# Patient Record
Sex: MALE | Race: BLACK OR AFRICAN AMERICAN | Employment: FULL TIME | URBAN - METROPOLITAN AREA
[De-identification: names, ages, dates, MRNs, and addresses within clinical notes are randomized per-mention and may not be internally consistent; named-entity substitution may affect disease eponyms.]

---

## 2020-08-18 ENCOUNTER — OFFICE VISIT (OUTPATIENT)
Dept: ORTHOPEDIC SURGERY | Age: 65
End: 2020-08-18
Payer: OTHER GOVERNMENT

## 2020-08-18 VITALS — WEIGHT: 180 LBS | RESPIRATION RATE: 15 BRPM | BODY MASS INDEX: 27.28 KG/M2 | HEIGHT: 68 IN

## 2020-08-18 PROCEDURE — 99203 OFFICE O/P NEW LOW 30 MIN: CPT | Performed by: ORTHOPAEDIC SURGERY

## 2020-08-18 RX ORDER — PRAVASTATIN SODIUM 40 MG
TABLET ORAL
COMMUNITY
Start: 2020-08-08

## 2020-08-18 ASSESSMENT — ENCOUNTER SYMPTOMS
EYE REDNESS: 0
EYE PAIN: 0
CHEST TIGHTNESS: 0
COLOR CHANGE: 0
SHORTNESS OF BREATH: 0
WHEEZING: 0
VOMITING: 0

## 2020-08-18 NOTE — PATIENT INSTRUCTIONS
Continue weight-bearing as tolerated. Continue range of motion exercises as instructed. Ice and elevate as needed. Tylenol or Motrin for pain. We will schedule surgery at soonest convenience. If you have any questions regarding your surgery please call our office and ask to speak with Fallon.  [phone]

## 2020-08-18 NOTE — PROGRESS NOTES
Patient is here today for right shoulder pain. Patient states that he did have his left rotator cuff repaired in 2016 by Dr Will Carcamo. Patient states that his right shoulder is starting to feel the same way his left did. Patient states he can't sleep at night do to the pain. He has been doing physical therapy which has help with his ROM. patient states nothing helps with the pain. He will take 800 mg of ibuprofen to help with the swelling in the right shoulder. Patient states pain today is an 8/10 he is in constant pain. When he is using his arm he will feel some sharp stabbing pain in the anterior part of the shoulder.

## 2020-08-19 NOTE — PROGRESS NOTES
8/18/2020   Chief Complaint   Patient presents with    Shoulder Pain     right shoulder pain (MRI completed) referred per Guru        History of Present Illness:                             Melissa Lechuga is a 59 y.o. male who presents today for evaluation of his right shoulder pain and weakness. He does not recall any severe injuries but has had multiple minor strains to the right shoulder over the years and has developed progressive worsening pain and weakness issues that have been significantly limiting to him over the last 6 months. He is now having difficulty with extending his arm overhead and reaching out away from his body. His pain is most severe at the superior and lateral aspects of his arm and worse with lifting pushing pulling activities. He also has pain while lying on that side at night. His symptoms feel similar to what he is experienced in the past on his left shoulder. He previously had a rotator cuff tear on the left that was repaired by myself in 2016. He went on to heal well from this procedure and is now functioning extremely well with respect to his left shoulder. Patient is here today for right shoulder pain. Patient states that he did have his left rotator cuff repaired in 2016 by Dr Karolyn Burrell. Patient states that his right shoulder is starting to feel the same way his left did. Patient states he can't sleep at night do to the pain. He has been doing physical therapy which has help with his ROM. patient states nothing helps with the pain. He will take 800 mg of ibuprofen to help with the swelling in the right shoulder. Patient states pain today is an 8/10 he is in constant pain. When he is using his arm he will feel some sharp stabbing pain in the anterior part of the shoulder. Medical History  Patient's medications, allergies, past medical, surgical, social and family histories were reviewed and updated as appropriate. No past medical history on file.   No family history on file. Social History     Socioeconomic History    Marital status: Single     Spouse name: None    Number of children: None    Years of education: None    Highest education level: None   Occupational History    None   Social Needs    Financial resource strain: None    Food insecurity     Worry: None     Inability: None    Transportation needs     Medical: None     Non-medical: None   Tobacco Use    Smoking status: Unknown If Ever Smoked   Substance and Sexual Activity    Alcohol use: Not Currently    Drug use: Never    Sexual activity: Yes   Lifestyle    Physical activity     Days per week: None     Minutes per session: None    Stress: None   Relationships    Social connections     Talks on phone: None     Gets together: None     Attends Zoroastrianism service: None     Active member of club or organization: None     Attends meetings of clubs or organizations: None     Relationship status: None    Intimate partner violence     Fear of current or ex partner: None     Emotionally abused: None     Physically abused: None     Forced sexual activity: None   Other Topics Concern    None   Social History Narrative    None     Current Outpatient Medications   Medication Sig Dispense Refill    pravastatin (PRAVACHOL) 40 MG tablet TAKE 1 TABLET BY MOUTH ONCE DAILY       No current facility-administered medications for this visit. No Known Allergies    Review of Systems:   Review of Systems   Constitutional: Negative for chills and fever. HENT: Negative for congestion and sneezing. Eyes: Negative for pain and redness. Respiratory: Negative for chest tightness, shortness of breath and wheezing. Cardiovascular: Negative for chest pain and palpitations. Gastrointestinal: Negative for vomiting. Musculoskeletal: Positive for arthralgias. Skin: Negative for color change and rash. Psychiatric/Behavioral: Negative for agitation. The patient is not nervous/anxious. Examination:  General Exam:  Vitals: Resp 15   Ht 5' 8\" (1.727 m)   Wt 180 lb (81.6 kg)   BMI 27.37 kg/m²    Physical Exam  Vitals signs and nursing note reviewed. Constitutional:       Appearance: Normal appearance. HENT:      Head: Normocephalic and atraumatic. Eyes:      Conjunctiva/sclera: Conjunctivae normal.      Pupils: Pupils are equal, round, and reactive to light. Neck:      Musculoskeletal: Normal range of motion. Pulmonary:      Effort: Pulmonary effort is normal.   Musculoskeletal:      Left shoulder: He exhibits normal range of motion, no tenderness, no bony tenderness, no swelling, no deformity, no laceration, no pain, normal pulse and normal strength. Right elbow: He exhibits normal range of motion, no swelling, no effusion, no deformity and no laceration. No tenderness found. Left elbow: Normal.      Comments: Right Upper Extremity:    There is moderate to severe tenderness diffusely throughout the shoulder. Tenderness to palpation is maximal over the anterior and lateral aspects of the shoulder specifically along the greater tuberosity and proximal biceps tendon. Active range of motion is limited due to pain. Forward elevation present to 100 degrees, abduction present to 80 degrees, external rotation 15 degrees. Passive range of motion is moderately restricted and limited due to pain and apprehension. Forward elevation 120 degrees, abduction 100 degrees. Rotator cuff testing is difficult due to pain. Strength 4/5 forward elevation and abduction of the shoulder. There is breakaway to strength testing due to pain. Positive empty can test.  Positive drop arm sign. Positive Gusman and Neer signs. Moderate pain at the acromioclavicular joint with crossarm test.    Skin is intact. Sensation is intact in the upper extremity. 2+ radial pulse. No edema. No muscle atrophy. Skin:     General: Skin is warm and dry.    Neurological:      Mental repair. We discussed the potential for residual pain, weakness, or stiffness at the shoulder despite appropriate surgical intervention. We discussed the goals of surgery to restore range of motion and strength which may take months to return. The patient understands and would like to proceed with shoulder arthroscopy for rotator cuff repair and subacromial decompression. The patient was counseled at length about the risks of luba Covid-19 during their perioperative period and any recovery window from their procedure. The patient was made aware that luba Covid-19  may worsen their prognosis for recovering from their procedure  and lend to a higher morbidity and/or mortality risk. All material risks, benefits, and reasonable alternatives including postponing the procedure were discussed. The patient does wish to proceed with the procedure at this time.       Electronically signed by Steven Rollins MD on 8/18/2020 at 8:16 PM

## 2020-08-26 ENCOUNTER — TELEPHONE (OUTPATIENT)
Dept: ORTHOPEDIC SURGERY | Age: 65
End: 2020-08-26

## 2020-08-26 ENCOUNTER — ANESTHESIA EVENT (OUTPATIENT)
Dept: OPERATING ROOM | Age: 65
End: 2020-08-26
Payer: OTHER GOVERNMENT

## 2020-08-26 NOTE — TELEPHONE ENCOUNTER
Left message  Patient needs pre testing done.  He is to arrive at the hospital in [facility] at 8 am on 8/27/2020 to have pre testing and his covid testing done before surgery on Monday august 31, 2020

## 2020-08-27 ENCOUNTER — HOSPITAL ENCOUNTER (OUTPATIENT)
Dept: PREADMISSION TESTING | Age: 65
Discharge: HOME | End: 2020-08-31
Payer: COMMERCIAL

## 2020-08-27 VITALS
SYSTOLIC BLOOD PRESSURE: 147 MMHG | HEART RATE: 80 BPM | WEIGHT: 174 LBS | HEIGHT: 65 IN | BODY MASS INDEX: 28.99 KG/M2 | TEMPERATURE: 97.4 F | OXYGEN SATURATION: 99 % | DIASTOLIC BLOOD PRESSURE: 85 MMHG | RESPIRATION RATE: 16 BRPM

## 2020-08-27 LAB
ANION GAP SERPL CALCULATED.3IONS-SCNC: 10 MMOL/L (ref 4–16)
BUN BLDV-MCNC: 15 MG/DL (ref 6–23)
CALCIUM SERPL-MCNC: 10 MG/DL (ref 8.3–10.6)
CHLORIDE BLD-SCNC: 105 MMOL/L (ref 99–110)
CO2: 26 MMOL/L (ref 21–32)
CREAT SERPL-MCNC: 1.2 MG/DL (ref 0.9–1.3)
EKG ATRIAL RATE: 70 BPM
EKG DIAGNOSIS: NORMAL
EKG P AXIS: 67 DEGREES
EKG P-R INTERVAL: 142 MS
EKG Q-T INTERVAL: 378 MS
EKG QRS DURATION: 86 MS
EKG QTC CALCULATION (BAZETT): 408 MS
EKG R AXIS: -1 DEGREES
EKG T AXIS: 17 DEGREES
EKG VENTRICULAR RATE: 70 BPM
GFR AFRICAN AMERICAN: >60 ML/MIN/1.73M2
GFR NON-AFRICAN AMERICAN: >60 ML/MIN/1.73M2
GLUCOSE BLD-MCNC: 111 MG/DL (ref 70–99)
HCT VFR BLD CALC: 49 % (ref 42–52)
HEMOGLOBIN: 15 GM/DL (ref 13.5–18)
MCH RBC QN AUTO: 28 PG (ref 27–31)
MCHC RBC AUTO-ENTMCNC: 30.6 % (ref 32–36)
MCV RBC AUTO: 91.4 FL (ref 78–100)
PDW BLD-RTO: 12.8 % (ref 11.7–14.9)
PLATELET # BLD: 267 K/CU MM (ref 140–440)
PMV BLD AUTO: 11 FL (ref 7.5–11.1)
POTASSIUM SERPL-SCNC: 4.8 MMOL/L (ref 3.5–5.1)
RBC # BLD: 5.36 M/CU MM (ref 4.6–6.2)
SODIUM BLD-SCNC: 141 MMOL/L (ref 135–145)
WBC # BLD: 4.8 K/CU MM (ref 4–10.5)

## 2020-08-27 PROCEDURE — 85027 COMPLETE CBC AUTOMATED: CPT

## 2020-08-27 PROCEDURE — 36415 COLL VENOUS BLD VENIPUNCTURE: CPT

## 2020-08-27 PROCEDURE — 93010 ELECTROCARDIOGRAM REPORT: CPT | Performed by: INTERNAL MEDICINE

## 2020-08-27 PROCEDURE — U0002 COVID-19 LAB TEST NON-CDC: HCPCS

## 2020-08-27 PROCEDURE — C9803 HOPD COVID-19 SPEC COLLECT: HCPCS

## 2020-08-27 PROCEDURE — 80048 BASIC METABOLIC PNL TOTAL CA: CPT

## 2020-08-27 PROCEDURE — 93005 ELECTROCARDIOGRAM TRACING: CPT | Performed by: NURSE PRACTITIONER

## 2020-08-27 RX ORDER — AMOXICILLIN 500 MG
CAPSULE ORAL DAILY
COMMUNITY

## 2020-08-27 RX ORDER — ASPIRIN 325 MG
325 TABLET ORAL DAILY
COMMUNITY

## 2020-08-27 RX ORDER — LANOLIN ALCOHOL/MO/W.PET/CERES
500 CREAM (GRAM) TOPICAL 2 TIMES DAILY
COMMUNITY

## 2020-08-27 ASSESSMENT — PAIN DESCRIPTION - ONSET: ONSET: ON-GOING

## 2020-08-27 ASSESSMENT — PAIN DESCRIPTION - PAIN TYPE: TYPE: CHRONIC PAIN

## 2020-08-27 ASSESSMENT — PAIN DESCRIPTION - ORIENTATION: ORIENTATION: RIGHT

## 2020-08-27 ASSESSMENT — PAIN DESCRIPTION - FREQUENCY: FREQUENCY: INTERMITTENT

## 2020-08-27 ASSESSMENT — PAIN DESCRIPTION - PROGRESSION: CLINICAL_PROGRESSION: GRADUALLY WORSENING

## 2020-08-27 ASSESSMENT — PAIN SCALES - GENERAL: PAINLEVEL_OUTOF10: 7

## 2020-08-27 ASSESSMENT — PAIN DESCRIPTION - LOCATION: LOCATION: SHOULDER

## 2020-08-27 NOTE — ANESTHESIA PRE PROCEDURE
Procedure Laterality Date    COLONOSCOPY  age51's    HAND SURGERY      \"tendon snapped in thumb right hand - over 20 yrs\"    SHOULDER SURGERY Left 2016    rot cuff    VASECTOMY         Social History:    Social History     Tobacco Use    Smoking status: Former Smoker     Packs/day: 0.25     Years: 7.00     Pack years: 1.75     Types: Cigarettes     Last attempt to quit: 2000     Years since quittin.5    Smokeless tobacco: Never Used   Substance Use Topics    Alcohol use: Not Currently     Comment: average\"2 - 3imes per week- 1-2 beers \"                                Counseling given: Not Answered      Vital Signs (Current):   Vitals:    20 0823   BP: (!) 147/85   Pulse: 80   Resp: 16   Temp: 36.3 °C (97.4 °F)   TempSrc: Temporal   SpO2: 99%   Weight: 174 lb (78.9 kg)   Height: 5' 5\" (1.651 m)                                              BP Readings from Last 3 Encounters:   20 (!) 147/85       NPO Status:                                                                                 BMI:   Wt Readings from Last 3 Encounters:   20 174 lb (78.9 kg)   20 180 lb (81.6 kg)     Body mass index is 28.96 kg/m².       CBC  Lab Results   Component Value Date/Time    WBC 4.8 2020 08:20 AM    HGB 15.0 2020 08:20 AM    HCT 49.0 2020 08:20 AM     2020 08:20 AM     RENAL  Lab Results   Component Value Date/Time     2020 08:20 AM    K 4.8 2020 08:20 AM     2020 08:20 AM    CO2 26 2020 08:20 AM    BUN 15 2020 08:20 AM    CREATININE 1.2 2020 08:20 AM    GLUCOSE 111 (H) 2020 08:20 AM         COVID-19 Screening (If Applicable): No results found for: COVID19      Anesthesia Evaluation  Patient summary reviewed and Nursing notes reviewed no history of anesthetic complications:   Airway: Mallampati: II  TM distance: >3 FB   Neck ROM: full  Mouth opening: > = 3 FB Dental: normal exam         Pulmonary: ROS comment:   Remote former smoker      PAT Airway. Limited exam. COVID 19 precautions. Patient wearing mask  Head/Neck movement: full  History of difficult intubation:  None  Teeth: missing upper and lower molars   Able to lie flat       COVID 19 screening  Denies recent fever or known COVID 19 exposure. Instructed to quarantine until surgery and report any new respiratory or fever symptoms to surgeon. Aware COVID testing must be completed before DOS. COVID swab:  2020   Cardiovascular:  Exercise tolerance: good (>4 METS),   (+) hyperlipidemia      ECG reviewed               Beta Blocker:  Not on Beta Blocker      ROS comment:   CP or SOB: NO  Exercise:  walks 3 miles per day    EK2020  NSR     Neuro/Psych:   Negative Neuro/Psych ROS               ROS comment: Mini-cog evaluation performed per anesthesia protocol,    > age 72. Scored: /  Copy on chart for review. GI/Hepatic/Renal:        (-) GERD       Endo/Other:    (+) blood dyscrasia (on ASA 325mg, last dose 2020): anticoagulation therapy, arthritis (suha shoulders s/p left rotator cuff repair, 2016. Full ROM): OA., . Pt had PAT visit. ROS comment: Tendon repair right thumb,  Abdominal:           Vascular:                                    Anesthesia Plan      general and regional     ASA 3       Induction: intravenous. Anesthetic plan and risks discussed with patient. Plan discussed with CRNA and attending. Attending anesthesiologist reviewed and agrees with Pre Eval content              KEATON Hood - CNP Chart reviewed, pt. Interviewed and examined. Anesthesia Evaluation will follow by a certified practitioner prior to surgery.   2020

## 2020-08-28 LAB
SARS-COV-2: NOT DETECTED
SOURCE: NORMAL

## 2020-08-31 ENCOUNTER — HOSPITAL ENCOUNTER (OUTPATIENT)
Age: 65
Setting detail: OUTPATIENT SURGERY
Discharge: HOME | End: 2020-08-31
Attending: ORTHOPAEDIC SURGERY | Admitting: ORTHOPAEDIC SURGERY
Payer: OTHER GOVERNMENT

## 2020-08-31 ENCOUNTER — ANESTHESIA (OUTPATIENT)
Dept: OPERATING ROOM | Age: 65
End: 2020-08-31
Payer: OTHER GOVERNMENT

## 2020-08-31 VITALS
TEMPERATURE: 95.5 F | OXYGEN SATURATION: 100 % | DIASTOLIC BLOOD PRESSURE: 91 MMHG | RESPIRATION RATE: 19 BRPM | SYSTOLIC BLOOD PRESSURE: 120 MMHG

## 2020-08-31 VITALS
TEMPERATURE: 96.7 F | WEIGHT: 174 LBS | DIASTOLIC BLOOD PRESSURE: 79 MMHG | RESPIRATION RATE: 16 BRPM | HEIGHT: 65 IN | SYSTOLIC BLOOD PRESSURE: 146 MMHG | HEART RATE: 92 BPM | OXYGEN SATURATION: 94 % | BODY MASS INDEX: 28.99 KG/M2

## 2020-08-31 PROCEDURE — 2580000003 HC RX 258

## 2020-08-31 PROCEDURE — 2580000003 HC RX 258: Performed by: ANESTHESIOLOGY

## 2020-08-31 PROCEDURE — C1713 ANCHOR/SCREW BN/BN,TIS/BN: HCPCS | Performed by: ORTHOPAEDIC SURGERY

## 2020-08-31 PROCEDURE — 3700000001 HC ADD 15 MINUTES (ANESTHESIA): Performed by: ORTHOPAEDIC SURGERY

## 2020-08-31 PROCEDURE — 6360000002 HC RX W HCPCS: Performed by: ORTHOPAEDIC SURGERY

## 2020-08-31 PROCEDURE — L3650 SO 8 ABD RESTRAINT PRE OTS: HCPCS | Performed by: ORTHOPAEDIC SURGERY

## 2020-08-31 PROCEDURE — 6360000002 HC RX W HCPCS

## 2020-08-31 PROCEDURE — 7100000010 HC PHASE II RECOVERY - FIRST 15 MIN: Performed by: ORTHOPAEDIC SURGERY

## 2020-08-31 PROCEDURE — 29826 SHO ARTHRS SRG DECOMPRESSION: CPT | Performed by: ORTHOPAEDIC SURGERY

## 2020-08-31 PROCEDURE — 3600000014 HC SURGERY LEVEL 4 ADDTL 15MIN: Performed by: ORTHOPAEDIC SURGERY

## 2020-08-31 PROCEDURE — 7100000001 HC PACU RECOVERY - ADDTL 15 MIN: Performed by: ORTHOPAEDIC SURGERY

## 2020-08-31 PROCEDURE — 2709999900 HC NON-CHARGEABLE SUPPLY: Performed by: ORTHOPAEDIC SURGERY

## 2020-08-31 PROCEDURE — 7100000011 HC PHASE II RECOVERY - ADDTL 15 MIN: Performed by: ORTHOPAEDIC SURGERY

## 2020-08-31 PROCEDURE — 64415 NJX AA&/STRD BRCH PLXS IMG: CPT

## 2020-08-31 PROCEDURE — 2720000010 HC SURG SUPPLY STERILE: Performed by: ORTHOPAEDIC SURGERY

## 2020-08-31 PROCEDURE — 3600000004 HC SURGERY LEVEL 4 BASE: Performed by: ORTHOPAEDIC SURGERY

## 2020-08-31 PROCEDURE — 7100000000 HC PACU RECOVERY - FIRST 15 MIN: Performed by: ORTHOPAEDIC SURGERY

## 2020-08-31 PROCEDURE — 29827 SHO ARTHRS SRG RT8TR CUF RPR: CPT | Performed by: ORTHOPAEDIC SURGERY

## 2020-08-31 PROCEDURE — 2500000003 HC RX 250 WO HCPCS

## 2020-08-31 PROCEDURE — 3700000000 HC ANESTHESIA ATTENDED CARE: Performed by: ORTHOPAEDIC SURGERY

## 2020-08-31 DEVICE — ANCHOR SUTURE BIOCOMP 4.75X19.1 MM SWIVELOCK C: Type: IMPLANTABLE DEVICE | Site: SHOULDER | Status: FUNCTIONAL

## 2020-08-31 DEVICE — ANCHOR SUT L14.7MM DIA5.5MM BIOCOMPOSITE FULL THRD W/ 2: Type: IMPLANTABLE DEVICE | Site: SHOULDER | Status: FUNCTIONAL

## 2020-08-31 RX ORDER — FENTANYL CITRATE 50 UG/ML
25 INJECTION, SOLUTION INTRAMUSCULAR; INTRAVENOUS EVERY 5 MIN PRN
Status: DISCONTINUED | OUTPATIENT
Start: 2020-08-31 | End: 2020-08-31 | Stop reason: HOSPADM

## 2020-08-31 RX ORDER — SODIUM CHLORIDE, SODIUM LACTATE, POTASSIUM CHLORIDE, CALCIUM CHLORIDE 600; 310; 30; 20 MG/100ML; MG/100ML; MG/100ML; MG/100ML
INJECTION, SOLUTION INTRAVENOUS CONTINUOUS
Status: DISCONTINUED | OUTPATIENT
Start: 2020-08-31 | End: 2020-08-31 | Stop reason: HOSPADM

## 2020-08-31 RX ORDER — FENTANYL CITRATE 50 UG/ML
INJECTION, SOLUTION INTRAMUSCULAR; INTRAVENOUS PRN
Status: DISCONTINUED | OUTPATIENT
Start: 2020-08-31 | End: 2020-08-31 | Stop reason: SDUPTHER

## 2020-08-31 RX ORDER — FENTANYL CITRATE 50 UG/ML
50 INJECTION, SOLUTION INTRAMUSCULAR; INTRAVENOUS EVERY 5 MIN PRN
Status: DISCONTINUED | OUTPATIENT
Start: 2020-08-31 | End: 2020-08-31 | Stop reason: HOSPADM

## 2020-08-31 RX ORDER — HYDROMORPHONE HCL 110MG/55ML
0.25 PATIENT CONTROLLED ANALGESIA SYRINGE INTRAVENOUS EVERY 5 MIN PRN
Status: DISCONTINUED | OUTPATIENT
Start: 2020-08-31 | End: 2020-08-31 | Stop reason: HOSPADM

## 2020-08-31 RX ORDER — PROPOFOL 10 MG/ML
INJECTION, EMULSION INTRAVENOUS PRN
Status: DISCONTINUED | OUTPATIENT
Start: 2020-08-31 | End: 2020-08-31 | Stop reason: SDUPTHER

## 2020-08-31 RX ORDER — HYDRALAZINE HYDROCHLORIDE 20 MG/ML
5 INJECTION INTRAMUSCULAR; INTRAVENOUS EVERY 10 MIN PRN
Status: DISCONTINUED | OUTPATIENT
Start: 2020-08-31 | End: 2020-08-31 | Stop reason: HOSPADM

## 2020-08-31 RX ORDER — ROPIVACAINE HYDROCHLORIDE 5 MG/ML
INJECTION, SOLUTION EPIDURAL; INFILTRATION; PERINEURAL
Status: COMPLETED | OUTPATIENT
Start: 2020-08-31 | End: 2020-08-31

## 2020-08-31 RX ORDER — ROCURONIUM BROMIDE 10 MG/ML
INJECTION, SOLUTION INTRAVENOUS PRN
Status: DISCONTINUED | OUTPATIENT
Start: 2020-08-31 | End: 2020-08-31 | Stop reason: SDUPTHER

## 2020-08-31 RX ORDER — PHENYLEPHRINE HYDROCHLORIDE 10 MG/ML
INJECTION INTRAVENOUS PRN
Status: DISCONTINUED | OUTPATIENT
Start: 2020-08-31 | End: 2020-08-31 | Stop reason: SDUPTHER

## 2020-08-31 RX ORDER — PROMETHAZINE HYDROCHLORIDE 25 MG/ML
6.25 INJECTION, SOLUTION INTRAMUSCULAR; INTRAVENOUS
Status: DISCONTINUED | OUTPATIENT
Start: 2020-08-31 | End: 2020-08-31 | Stop reason: HOSPADM

## 2020-08-31 RX ORDER — LIDOCAINE HYDROCHLORIDE 20 MG/ML
INJECTION, SOLUTION INTRAVENOUS PRN
Status: DISCONTINUED | OUTPATIENT
Start: 2020-08-31 | End: 2020-08-31 | Stop reason: SDUPTHER

## 2020-08-31 RX ORDER — NEOSTIGMINE METHYLSULFATE 1 MG/ML
INJECTION, SOLUTION INTRAVENOUS PRN
Status: DISCONTINUED | OUTPATIENT
Start: 2020-08-31 | End: 2020-08-31 | Stop reason: SDUPTHER

## 2020-08-31 RX ORDER — LABETALOL HYDROCHLORIDE 5 MG/ML
5 INJECTION, SOLUTION INTRAVENOUS EVERY 10 MIN PRN
Status: DISCONTINUED | OUTPATIENT
Start: 2020-08-31 | End: 2020-08-31 | Stop reason: HOSPADM

## 2020-08-31 RX ORDER — OXYCODONE HYDROCHLORIDE AND ACETAMINOPHEN 5; 325 MG/1; MG/1
1 TABLET ORAL EVERY 6 HOURS PRN
Qty: 28 TABLET | Refills: 0 | Status: SHIPPED | OUTPATIENT
Start: 2020-08-31 | End: 2020-09-07

## 2020-08-31 RX ORDER — ONDANSETRON 2 MG/ML
4 INJECTION INTRAMUSCULAR; INTRAVENOUS
Status: DISCONTINUED | OUTPATIENT
Start: 2020-08-31 | End: 2020-08-31 | Stop reason: HOSPADM

## 2020-08-31 RX ORDER — GLYCOPYRROLATE 0.2 MG/ML
INJECTION INTRAMUSCULAR; INTRAVENOUS PRN
Status: DISCONTINUED | OUTPATIENT
Start: 2020-08-31 | End: 2020-08-31 | Stop reason: SDUPTHER

## 2020-08-31 RX ORDER — HYDROMORPHONE HCL 110MG/55ML
0.5 PATIENT CONTROLLED ANALGESIA SYRINGE INTRAVENOUS EVERY 5 MIN PRN
Status: DISCONTINUED | OUTPATIENT
Start: 2020-08-31 | End: 2020-08-31 | Stop reason: HOSPADM

## 2020-08-31 RX ORDER — IBUPROFEN 600 MG/1
600 TABLET ORAL 3 TIMES DAILY PRN
Qty: 90 TABLET | Refills: 0 | Status: SHIPPED | OUTPATIENT
Start: 2020-08-31

## 2020-08-31 RX ADMIN — PHENYLEPHRINE HYDROCHLORIDE 100 MCG: 10 INJECTION INTRAVENOUS at 10:24

## 2020-08-31 RX ADMIN — ROPIVACAINE HYDROCHLORIDE 20 ML: 5 INJECTION, SOLUTION EPIDURAL; INFILTRATION; PERINEURAL at 09:20

## 2020-08-31 RX ADMIN — Medication 4 MG: at 11:45

## 2020-08-31 RX ADMIN — ROCURONIUM BROMIDE 40 MG: 10 INJECTION INTRAVENOUS at 10:01

## 2020-08-31 RX ADMIN — SODIUM CHLORIDE, POTASSIUM CHLORIDE, SODIUM LACTATE AND CALCIUM CHLORIDE: 600; 310; 30; 20 INJECTION, SOLUTION INTRAVENOUS at 11:03

## 2020-08-31 RX ADMIN — FENTANYL CITRATE 50 MCG: 50 INJECTION INTRAMUSCULAR; INTRAVENOUS at 11:58

## 2020-08-31 RX ADMIN — FENTANYL CITRATE 50 MCG: 50 INJECTION INTRAMUSCULAR; INTRAVENOUS at 10:06

## 2020-08-31 RX ADMIN — GLYCOPYRROLATE 0.4 MG: 0.2 INJECTION, SOLUTION INTRAMUSCULAR; INTRAVENOUS at 11:44

## 2020-08-31 RX ADMIN — PHENYLEPHRINE HYDROCHLORIDE 100 MCG: 10 INJECTION INTRAVENOUS at 10:38

## 2020-08-31 RX ADMIN — LIDOCAINE HYDROCHLORIDE 100 MG: 20 INJECTION, SOLUTION INTRAVENOUS at 09:59

## 2020-08-31 RX ADMIN — CEFAZOLIN SODIUM 2 G: 10 INJECTION, POWDER, FOR SOLUTION INTRAVENOUS at 10:04

## 2020-08-31 RX ADMIN — GLYCOPYRROLATE 0.2 MG: 0.2 INJECTION, SOLUTION INTRAMUSCULAR; INTRAVENOUS at 11:46

## 2020-08-31 RX ADMIN — PROPOFOL 150 MG: 10 INJECTION, EMULSION INTRAVENOUS at 10:00

## 2020-08-31 RX ADMIN — PHENYLEPHRINE HYDROCHLORIDE 100 MCG: 10 INJECTION INTRAVENOUS at 10:17

## 2020-08-31 RX ADMIN — SODIUM CHLORIDE, POTASSIUM CHLORIDE, SODIUM LACTATE AND CALCIUM CHLORIDE: 600; 310; 30; 20 INJECTION, SOLUTION INTRAVENOUS at 09:01

## 2020-08-31 RX ADMIN — FENTANYL CITRATE 50 MCG: 50 INJECTION INTRAMUSCULAR; INTRAVENOUS at 12:01

## 2020-08-31 RX ADMIN — SODIUM CHLORIDE, POTASSIUM CHLORIDE, SODIUM LACTATE AND CALCIUM CHLORIDE: 600; 310; 30; 20 INJECTION, SOLUTION INTRAVENOUS at 09:47

## 2020-08-31 RX ADMIN — PHENYLEPHRINE HYDROCHLORIDE 100 MCG: 10 INJECTION INTRAVENOUS at 10:33

## 2020-08-31 RX ADMIN — FENTANYL CITRATE 50 MCG: 50 INJECTION INTRAMUSCULAR; INTRAVENOUS at 10:00

## 2020-08-31 RX ADMIN — PHENYLEPHRINE HYDROCHLORIDE 20 MCG: 10 INJECTION INTRAVENOUS at 10:37

## 2020-08-31 ASSESSMENT — PULMONARY FUNCTION TESTS
PIF_VALUE: 17
PIF_VALUE: 18
PIF_VALUE: 1
PIF_VALUE: 11
PIF_VALUE: 18
PIF_VALUE: 18
PIF_VALUE: 24
PIF_VALUE: 19
PIF_VALUE: 17
PIF_VALUE: 18
PIF_VALUE: 18
PIF_VALUE: 20
PIF_VALUE: 22
PIF_VALUE: 19
PIF_VALUE: 18
PIF_VALUE: 21
PIF_VALUE: 26
PIF_VALUE: 20
PIF_VALUE: 19
PIF_VALUE: 18
PIF_VALUE: 17
PIF_VALUE: 18
PIF_VALUE: 6
PIF_VALUE: 17
PIF_VALUE: 18
PIF_VALUE: 17
PIF_VALUE: 18
PIF_VALUE: 1
PIF_VALUE: 17
PIF_VALUE: 18
PIF_VALUE: 19
PIF_VALUE: 2
PIF_VALUE: 18
PIF_VALUE: 18
PIF_VALUE: 17
PIF_VALUE: 18
PIF_VALUE: 17
PIF_VALUE: 19
PIF_VALUE: 18
PIF_VALUE: 17
PIF_VALUE: 19
PIF_VALUE: 0
PIF_VALUE: 25
PIF_VALUE: 18
PIF_VALUE: 18
PIF_VALUE: 25
PIF_VALUE: 17
PIF_VALUE: 18
PIF_VALUE: 18
PIF_VALUE: 22
PIF_VALUE: 18
PIF_VALUE: 17
PIF_VALUE: 17
PIF_VALUE: 5
PIF_VALUE: 18
PIF_VALUE: 20
PIF_VALUE: 18
PIF_VALUE: 18
PIF_VALUE: 17
PIF_VALUE: 18
PIF_VALUE: 19
PIF_VALUE: 18
PIF_VALUE: 18
PIF_VALUE: 20
PIF_VALUE: 20
PIF_VALUE: 0
PIF_VALUE: 22
PIF_VALUE: 18
PIF_VALUE: 17
PIF_VALUE: 39
PIF_VALUE: 27
PIF_VALUE: 21
PIF_VALUE: 18
PIF_VALUE: 32
PIF_VALUE: 18
PIF_VALUE: 17
PIF_VALUE: 18
PIF_VALUE: 0
PIF_VALUE: 20
PIF_VALUE: 19
PIF_VALUE: 18
PIF_VALUE: 17
PIF_VALUE: 0
PIF_VALUE: 18
PIF_VALUE: 28
PIF_VALUE: 0
PIF_VALUE: 18
PIF_VALUE: 7
PIF_VALUE: 16
PIF_VALUE: 26
PIF_VALUE: 23
PIF_VALUE: 18
PIF_VALUE: 20
PIF_VALUE: 1
PIF_VALUE: 17
PIF_VALUE: 1
PIF_VALUE: 18
PIF_VALUE: 18
PIF_VALUE: 16
PIF_VALUE: 18
PIF_VALUE: 22
PIF_VALUE: 18
PIF_VALUE: 17
PIF_VALUE: 22
PIF_VALUE: 6
PIF_VALUE: 19
PIF_VALUE: 5
PIF_VALUE: 19
PIF_VALUE: 17
PIF_VALUE: 18
PIF_VALUE: 2
PIF_VALUE: 17
PIF_VALUE: 19
PIF_VALUE: 18
PIF_VALUE: 25
PIF_VALUE: 18
PIF_VALUE: 0
PIF_VALUE: 18

## 2020-08-31 ASSESSMENT — PAIN SCALES - GENERAL
PAINLEVEL_OUTOF10: 0

## 2020-08-31 ASSESSMENT — PAIN DESCRIPTION - DESCRIPTORS: DESCRIPTORS: ACHING

## 2020-08-31 NOTE — ANESTHESIA POSTPROCEDURE EVALUATION
Department of Anesthesiology  Postprocedure Note    Patient: Dada Del Rio  MRN: [id number]  YOB: 1955  Date of evaluation: 8/31/2020  Time:  12:13 PM     Procedure Summary     Date:  08/31/20 Room / Location:  [address]    Anesthesia Start:  8148 Anesthesia Stop:      Procedure:  RIGHT SHOULDER ARTHROSCOPY ROTATOR CUFF REPAIR SUBACROMIAL DECOMPRESSION (Right Shoulder) Diagnosis:  (RIGHT ROTATOR CUFF TEAR)    Surgeon:  Kimberlyn Barrios MD Responsible Provider:  KEATON Carvalho CRNA    Anesthesia Type:  general, regional ASA Status:  3          Anesthesia Type: general, regional    Jose Phase I: Jose Score: 10    Jose Phase II:      Last vitals: Reviewed and per EMR flowsheets.        Anesthesia Post Evaluation    Patient location during evaluation: PACU  Patient participation: complete - patient participated  Level of consciousness: awake and alert  Pain score: 0  Airway patency: patent  Nausea & Vomiting: no nausea and no vomiting  Complications: no  Cardiovascular status: hemodynamically stable and blood pressure returned to baseline  Respiratory status: acceptable, spontaneous ventilation, nonlabored ventilation and face mask  Hydration status: stable

## 2020-08-31 NOTE — PROGRESS NOTES
1208: Arrived to PACU from OR. Monitors applied, alarms on. Report obtained from Corine Persaud and Levester Standing CRNA. 1235: Repositioned in bed, warm blankets on. Denies pain, voided 200cc clear yellow urine. States right arm and hand numb, able to wiggle fingers. 1245: Transported to [facility].

## 2020-08-31 NOTE — H&P
Chief Complaint   Patient presents with    Shoulder Pain       right shoulder pain (MRI completed) referred per Guru         History of Present Illness:                             Bert Hernández is a 59 y.o. male who presents today for evaluation of his right shoulder pain and weakness. He does not recall any severe injuries but has had multiple minor strains to the right shoulder over the years and has developed progressive worsening pain and weakness issues that have been significantly limiting to him over the last 6 months. He is now having difficulty with extending his arm overhead and reaching out away from his body. His pain is most severe at the superior and lateral aspects of his arm and worse with lifting pushing pulling activities. He also has pain while lying on that side at night.     His symptoms feel similar to what he is experienced in the past on his left shoulder. He previously had a rotator cuff tear on the left that was repaired by myself in 2016. He went on to heal well from this procedure and is now functioning extremely well with respect to his left shoulder.     Patient is here today for right shoulder pain. Patient states that he did have his left rotator cuff repaired in 2016 by Dr Danni Montana. Patient states that his right shoulder is starting to feel the same way his left did. Patient states he can't sleep at night do to the pain. He has been doing physical therapy which has help with his ROM. patient states nothing helps with the pain. He will take 800 mg of ibuprofen to help with the swelling in the right shoulder. Patient states pain today is an 8/10 he is in constant pain. When he is using his arm he will feel some sharp stabbing pain in the anterior part of the shoulder.      Medical History  Patient's medications, allergies, past medical, surgical, social and family histories were reviewed and updated as appropriate.     Past Medical History   No past medical history on file. Skin: Negative for color change and rash. Psychiatric/Behavioral: Negative for agitation. The patient is not nervous/anxious.                                                  Examination:  General Exam:  Vitals: Resp 15   Ht 5' 8\" (1.727 m)   Wt 180 lb (81.6 kg)   BMI 27.37 kg/m²    Physical Exam  Vitals signs and nursing note reviewed. Constitutional:       Appearance: Normal appearance. HENT:      Head: Normocephalic and atraumatic. Eyes:      Conjunctiva/sclera: Conjunctivae normal.      Pupils: Pupils are equal, round, and reactive to light. Neck:      Musculoskeletal: Normal range of motion. Pulmonary:      Effort: Pulmonary effort is normal.   Musculoskeletal:      Left shoulder: He exhibits normal range of motion, no tenderness, no bony tenderness, no swelling, no deformity, no laceration, no pain, normal pulse and normal strength. Right elbow: He exhibits normal range of motion, no swelling, no effusion, no deformity and no laceration. No tenderness found. Left elbow: Normal.      Comments: Right Upper Extremity:    There is moderate to severe tenderness diffusely throughout the shoulder. Tenderness to palpation is maximal over the anterior and lateral aspects of the shoulder specifically along the greater tuberosity and proximal biceps tendon. Active range of motion is limited due to pain. Forward elevation present to 100 degrees, abduction present to 80 degrees, external rotation 15 degrees. Passive range of motion is moderately restricted and limited due to pain and apprehension. Forward elevation 120 degrees, abduction 100 degrees. Rotator cuff testing is difficult due to pain. Strength 4/5 forward elevation and abduction of the shoulder. There is breakaway to strength testing due to pain. Positive empty can test.  Positive drop arm sign. Positive Gusman and Neer signs. Moderate pain at the acromioclavicular joint with crossarm test.    Skin is intact.   Sensation is intact in the upper extremity. 2+ radial pulse. No edema. No muscle atrophy. Skin:     General: Skin is warm and dry. Neurological:      Mental Status: He is alert and oriented to person, place, and time. Psychiatric:         Mood and Affect: Mood normal.         Behavior: Behavior normal.               Diagnostic testing:  X-rays reviewed in office, I independently reviewed the films in the office today:   Xr Shoulder Right (min 2 Views)     Result Date: 8/18/2020   X-ray 4 views Left Shoulder: X-rays show no evidence of fracture or degenerative process. Normal bone density. Normal alignment. No abnormal calcifications or soft tissue swelling. Mild spurring at the undersurface of the acromion consistent with impingement syndrome. Type II acromion Impression: Normal x-ray        MRI images of the right shoulder reviewed by myself and discussed with the patient:  There is evidence of full-thickness rotator cuff tear involving the supraspinatus tendon with retraction of approximately 2 cm. There is no significant atrophy of the supraspinatus musculature. No evidence of biceps tendon tear or subluxation in the groove. Normal appearance of the articular cartilage of the glenohumeral joint.     Office Procedures:  No orders of the defined types were placed in this encounter.       Assessment and Plan  1. Right shoulder full-thickness rotator cuff tear     2. Right shoulder impingement syndrome     I reviewed the findings of the MRI with the patient. We discussed the severity of the injury to the rotator cuff. Given the patient's symptoms and the findings on the MRI I have recommended that we proceed with shoulder arthroscopy for rotator cuff repair and subacromial decompression.     We discussed the risks and benefits of surgery. We discussed the postoperative course and need for initial immobilization followed by subsequent rehabilitation and physical therapy.   He understands this as he has previously gone through surgery on his left side.     We discussed the potential risks with surgery including possible incomplete repair or rerupture after successful repair. We discussed the potential for residual pain, weakness, or stiffness at the shoulder despite appropriate surgical intervention. We discussed the goals of surgery to restore range of motion and strength which may take months to return.     The patient understands and would like to proceed with shoulder arthroscopy for rotator cuff repair and subacromial decompression.     The patient was counseled at length about the risks of luba Covid-19 during their perioperative period and any recovery window from their procedure.  The patient was made aware that luba Covid-19  may worsen their prognosis for recovering from their procedure  and lend to a higher morbidity and/or mortality risk.  All material risks, benefits, and reasonable alternatives including postponing the procedure were discussed. The patient does wish to proceed with the procedure at this time. History and Physical exam note from the office visit is copied above from epic. I have reviewed the note and examined the patient and find no relevant changes.      I have reviewed with the patient and/or family the risks, benefits, and alternatives to the procedure as well as expected postoperative course    Irma Doyle MD

## 2020-08-31 NOTE — OP NOTE
[address] [place] [address], [address] [place] [address]                                OPERATIVE REPORT    PATIENT NAME: Wesley Arboleda                     :        1955  MED REC NO:   [id number]                          ROOM:  ACCOUNT NO:   [de-identified]                           ADMIT DATE: 2020  PROVIDER:     Gypsy Bonilla MD    DATE OF PROCEDURE:  2020    PREOPERATIVE DIAGNOSES:  1. Right shoulder full-thickness rotator cuff tear. 2.  Right shoulder impingement syndrome. POSTOPERATIVE DIAGNOSES:  1. Right shoulder full-thickness rotator cuff tear. 2.  Right shoulder impingement syndrome. 3.  Right shoulder high-grade partial tear of biceps tendon of  intraarticular portion of the long head of the biceps tendon. SURGEON:  Gypsy Bonilla MD    ANESTHESIA:  General with a regional nerve block. ESTIMATED BLOOD LOSS:  Minimal.    COMPLICATIONS:  None. DISPOSITION:  To PACU in stable condition. OPERATIVE IMPLANTS:  Two Arthrex 5.5-mm bioabsorbable Corkscrew anchors  and two Arthrex bioabsorbable 4.75-mm SwiveLock anchors. PREOPERATIVE INDICATIONS:  The patient is a 57-year-old who has had  several months of worsening right shoulder pain and weakness and has  previously undergone similar issues with his left shoulder, which  previously responded well to surgical intervention by myself about three  years ago. He presented to my office with an MRI. We had a long  discussion regarding treatment options for his full-thickness rotator  cuff tear. I recommended surgical intervention for arthroscopic rotator  cuff repair and subacromial decompression. He understood and wished to  proceed and consent was obtained. DESCRIPTION OF PROCEDURE:  The patient was identified in the  preoperative area and the site was marked. A regional nerve block was  placed by the Anesthesia Department for postoperative pain relief.   He  was taken back to the operating room and placed supine on the beach  chair table. After induction of general endotracheal anesthesia, he was  placed semi-upright in the beach chair position with his head and neck  in neutral.  The right upper extremity was prepped and draped in sterile  fashion. Timeout was performed. Preoperative antibiotics were given. Examination under anesthesia revealed full passive range of motion with  no instability of the shoulder. The right upper extremity was prepped  and draped in sterile fashion. Time-out was performed. Preoperative  antibiotics were given. Standard posterior portal was established and arthroscope was inserted  intraarticularly. There was evidence of full-thickness tear involving  the supraspinatus tendon with mild retraction. There were severe  fraying and degenerative changes along the superior labrum and  intraarticular portion of the biceps tendon. Anterior portal was  established and then a shaver was used to perform an intraarticular  debridement along the frayed and torn superior labral tissue and biceps  tendon. Once this was completed, the biceps tendon was probed and found  to be highly torn, greater than 50% thickness with very degenerative  appearance of the remaining fibers adjacent to the labral attachment,  and therefore it was felt that he would benefit from a biceps tenotomy. The tenotomy was performed using the cautery device and shaver, and the  proximal biceps tendon was brought to retract into the bicipital groove. The remaining aspects of the shoulder appeared healthy with minimal  degenerative articular cartilage changes. There was no evidence of  tearing at the subscapularis tendon. Attention was then taken to the subacromial space and the posterior  portal was redirected.   A separate lateral portal was established and a  shaver was used to perform a bursectomy, and to better characterize and  evaluate the rotator cuff tissue,

## 2020-08-31 NOTE — PROGRESS NOTES
1850 - patient transferred to PACU for preoperative nerve block attended by [facility] CRNA and Dr Neymar Crisostomo.   0877 - patient transferred to OR on cart by anesthesia

## 2020-08-31 NOTE — PROGRESS NOTES
1247 Patient transferred from [facility] to [facility] via cart, he is resting quietly and denies needs at this time, patient has a sling in place ice on site, radial pulse is palpable, patient has sensation and is able to move his fingers, he denies pain at this time. 1315 Patient is resting quietly. 1345 Patient is sitting up drinking water and eating crackers. 333 Juan A Reyes wife Wilman Torrez and gave her report, told her patient has two prescriptions, one for percocet and one for ibuprofen. She verbalized understanding. 1430 Patient discharged to home, his wife will drive him.

## 2020-09-15 ENCOUNTER — OFFICE VISIT (OUTPATIENT)
Dept: ORTHOPEDIC SURGERY | Age: 65
End: 2020-09-15

## 2020-09-15 VITALS
BODY MASS INDEX: 28.99 KG/M2 | RESPIRATION RATE: 15 BRPM | HEIGHT: 65 IN | HEART RATE: 76 BPM | OXYGEN SATURATION: 94 % | WEIGHT: 174 LBS

## 2020-09-15 PROCEDURE — 99024 POSTOP FOLLOW-UP VISIT: CPT | Performed by: ORTHOPAEDIC SURGERY

## 2020-09-15 RX ORDER — OXYCODONE HYDROCHLORIDE AND ACETAMINOPHEN 5; 325 MG/1; MG/1
1 TABLET ORAL EVERY 8 HOURS PRN
Qty: 28 TABLET | Refills: 0 | Status: SHIPPED | OUTPATIENT
Start: 2020-09-15 | End: 2020-09-29

## 2020-09-15 NOTE — PATIENT INSTRUCTIONS
Continue weight-bearing as tolerated. Continue range of motion exercises as instructed. Ice and elevate as needed. Tylenol or Motrin for pain.   Sutures removed today   Follow up in one month   Physical therapy ordered today

## 2020-09-16 NOTE — PROGRESS NOTES
9/15/2020   Chief Complaint   Patient presents with    Post-Op Check     2 wk post right shoulder RC repair DOS 8/31/20        History of Present Illness:                             Bert Hernández is a 72 y.o. male who returns today for follow-up of his right shoulder rotator cuff repair. He has had a couple of minor injuries to the shoulder which increased his pain level denies any severe issues with instability or major fall. He complains of pain swelling stiffness throughout the shoulder but most significant anteriorly and laterally. He has been using a sling for comfort and support. He has been able to do mild range of motion activities as a home program.    Pt returns to the office today for 2 week post op check of the right shoulder RC repair DOS 8/31/20. Pt states pain today is a 5/10. He has injured his shoulder twice since surgery. Last week he went to push himself up in the car with his arms and felt instant increased pain in the right shoulder. He took some pain medication and the right shoulder felt better. Pt states last night he did run into the door in the middle of the night and felt instant pain in the anterior aspect of the right shoulder. Pt states he used ice to help with the pain but today he can still feel pain. He has been using his sling most of the time                                         Examination:  General Exam:  Vitals: Pulse 76   Resp 15   Ht 5' 5\" (1.651 m)   Wt 174 lb (78.9 kg)   SpO2 94%   BMI 28.96 kg/m²    Physical Exam   Operative Extremity:  Right upper extremity:  Well-healed surgical sites from arthroscopy. Sutures were removed. No erythema or drainage. Mild diffuse soft tissue swelling throughout the anterior lateral shoulder. Passive range of motion is present with forward elevation to 90 degrees and abduction to 80 degrees and gentle internal and external rotation with the arm by the side.   Sensation motor function is intact throughout the upper extremity. Assessment and Plan  1. Right shoulder rotator cuff repair and subacromial decompression    We reviewed the findings of the shoulder arthroscopy and the repair that was performed. I have explained to him that based on  His injury mechanism and current exam I do not suspect that he has disrupted his repair. I recommended that we continue with protection and immobilization as needed in the sling. He will be allowed to remove it for gentle range of motion activities and I have ordered physical therapy to assist him in stretching and range of motion of the shoulder. Continue using the sling but he can discontinue use after 2 weeks if therapy is going well. Follow-up in 1 month for check of his progress with therapy we will discuss advancing strengthening activities. He is continuing to have some mild swelling in his lower extremities and I recommended compression stockings and elevation to help with his lower extremity swelling. If this does not take care of his problem he should check with his primary care physician.     Electronically signed by Claudia Awan MD on 9/16/2020 at 2:33 PM

## 2020-10-03 ENCOUNTER — HOSPITAL ENCOUNTER (OUTPATIENT)
Dept: PHYSICAL THERAPY | Age: 65
Setting detail: THERAPIES SERIES
Discharge: HOME | End: 2020-10-03
Payer: OTHER GOVERNMENT

## 2020-10-03 PROCEDURE — 97016 VASOPNEUMATIC DEVICE THERAPY: CPT

## 2020-10-03 PROCEDURE — 97110 THERAPEUTIC EXERCISES: CPT

## 2020-10-03 PROCEDURE — 97140 MANUAL THERAPY 1/> REGIONS: CPT

## 2020-10-03 PROCEDURE — 97162 PT EVAL MOD COMPLEX 30 MIN: CPT

## 2020-10-03 ASSESSMENT — PAIN DESCRIPTION - ORIENTATION: ORIENTATION: RIGHT

## 2020-10-03 ASSESSMENT — PAIN - FUNCTIONAL ASSESSMENT: PAIN_FUNCTIONAL_ASSESSMENT: PREVENTS OR INTERFERES SOME ACTIVE ACTIVITIES AND ADLS

## 2020-10-03 ASSESSMENT — PAIN DESCRIPTION - FREQUENCY: FREQUENCY: CONTINUOUS

## 2020-10-03 ASSESSMENT — PAIN DESCRIPTION - PROGRESSION: CLINICAL_PROGRESSION: GRADUALLY IMPROVING

## 2020-10-03 ASSESSMENT — PAIN DESCRIPTION - LOCATION: LOCATION: SHOULDER

## 2020-10-03 ASSESSMENT — PAIN DESCRIPTION - ONSET: ONSET: GRADUAL

## 2020-10-03 ASSESSMENT — PAIN SCALES - GENERAL: PAINLEVEL_OUTOF10: 2

## 2020-10-03 ASSESSMENT — PAIN DESCRIPTION - PAIN TYPE: TYPE: SURGICAL PAIN

## 2020-10-03 NOTE — PLAN OF CARE
Outpatient Physical Therapy           [place]           [x] Phone: [phone]   Fax: [phone]  [place]           [] Phone: [phone]   Fax: [phone]     To: Referring Practitioner: Klever Urbano MD  From: Koko Feliciano PT, DPT     Patient: Jama Conte       : 1955  Diagnosis: Diagnosis: Traumatic complete tear of R RTC   Treatment Diagnosis: Treatment Diagnosis: R shoulder pain, RUE weakness, reduced R shoulder ROM   Date: 10/3/2020     Physical Therapy Certification/Re-Certification Form  Dear Dr. Suzie Ramirez,  The following patient has been evaluated for physical therapy services and for therapy to continue, insurance requires physician review of the treatment plan initially and every 90 days. Please review the attached evaluation and/or summary of the patient's plan of care, and verify that you agree therapy should continue by signing the attached document and sending it back to our office. Assessment:    Assessment: Pt is a 60-year-old male who underwent a RIGHT SHOULDER ARTHROSCOPY ROTATOR CUFF REPAIR (for Supraspinatus tear), SUBACROMIAL DECOMPRESSION and biceps tenotomy on 20. Pt presents with limitations in ROM of R shoulder, RUE weakness, inability to perform work related tasks, reduced independence with ADL tasks and R shoulder pain. Pt would benefit from skilled therapy interventions to address listed impairments, progress toward goal completion, and improve ADL independence. PT also warranted to reduce risk for re-injury or future decline. Patient agrees with established plan of care and assisted in the development of their short term and long term goals. Patient had no adverse reaction with initial treatment and there are no barriers to learning. Demonstrates no mental or cognitive disorder.      Plan of Care/Treatment to date:  [x] Therapeutic Exercise  [x] Modalities:  [x] Therapeutic Activity     [x] Ultrasound  [x] Electrical Stimulation  [] Gait Training [] Cervical Traction [] Lumbar Traction  [x] Neuromuscular Re-education    [x] Cold/hotpack [] Iontophoresis   [x] Instruction in HEP      [x] Vasopneumatic     [x] Manual Therapy               [] Aquatic Therapy       Other:          Frequency/Duration:  # Days per week: [] 1 day # Weeks: [] 1 week [] 5 weeks     [x] 2 days   [] 2 weeks [] 6 weeks     [] 3 days   [] 3 weeks [] 7 weeks     [] 4 days   [] 4 weeks [] 8 weeks         [] 9 weeks [] 10 weeks         [] 11 weeks [x] 12 weeks  (first 2 weeks 1x/week)    Rehab Potential/Progress: [] Excellent [x] Good [] Fair  [] Poor     Goals:      Short term goals  Time Frame for Short term goals: 4 weeks  Short term goal 1: Pt will acheive full PROM in all directions R shoulder  Short term goal 2: Pt will report 4 consecutive hours of sleep in bed or better with no interuption due to pain  Long term goals  Time Frame for Long term goals : 12 weeks  Long term goal 1: Pt will have full AROM in all directions R shoulder to aide in ADLs  Long term goal 2: Pt will improve RUE strength gross 4+/5 or higher to aide in work related tasks  Long term goal 3: Pt will improve Quick Dash to 33% or lower to show [address] and subjective improvement      Electronically signed by:  Jennifer Esposito PT, DPT 10/3/2020, 11:39 AM        If you have any questions or concerns, please don't hesitate to call.   Thank you for your referral.      Physician Signature:________________________________Date:_________ TIME: _____  By signing above, therapists plan is approved by physician

## 2020-10-03 NOTE — FLOWSHEET NOTE
Outpatient Physical Therapy  [place]           [x] Phone: [phone]   Fax: [phone]  [place]           [] Phone: [phone]   Fax: [phone]        Physical Therapy Daily Treatment Note  Date:  10/3/2020    Patient Name:  Ananda Antonio    :  1955  MRN: [id number]  Restrictions/Precautions:   RTC repair precautions - see protocol in chart  Diagnosis:   Diagnosis: Traumatic complete tear of R RTC  Date of Injury/Surgery: 20  Treatment Diagnosis: Treatment Diagnosis: R shoulder pain, RUE weakness, reduced R shoulder ROM    Insurance/Certification information: PT Insurance Information: UMR   Referring Physician:  Referring Practitioner: Butch Moody MD  Next Doctor Visit:  10/13  Plan of care signed (Y/N):  sent  Outcome Measure: Quick Dash: 43.18%  Visit# / total visits:    Pain level: 2/10   Goals:       Short term goals  Time Frame for Short term goals: 4 weeks  Short term goal 1: Pt will acheive full PROM in all directions R shoulder  Short term goal 2: Pt will report 4 consecutive hours of sleep in bed or better with no interuption due to pain  Long term goals  Time Frame for Long term goals : 12 weeks  Long term goal 1: Pt will have full AROM in all directions R shoulder to aide in ADLs  Long term goal 2: Pt will improve RUE strength gross 4+/5 or higher to aide in work related tasks  Long term goal 3: Pt will improve Quick Dash to 33% or lower to show [address] and subjective improvement    Summary of Evaluation: Assessment: Pt is a 60-year-old male who underwent a RIGHT SHOULDER ARTHROSCOPY ROTATOR CUFF REPAIR (for Supraspinatus tear), SUBACROMIAL DECOMPRESSION and biceps tenotomy on 20. Pt presents with limitations in ROM of R shoulder, RUE weakness, inability to perform work related tasks, reduced independence with ADL tasks and R shoulder pain.  Pt would benefit from skilled therapy interventions to address listed impairments, progress toward goal completion, and improve ADL independence. PT also warranted to reduce risk for re-injury or future decline. Subjective:  See eval         Any changes in Ambulatory Summary Sheet? None        Objective:  See eval   Prior to today's treatment session, patient was screened for signs and symptoms related to COVID-19 including but not limited to verbally answering questions related to feeling ill, cough, or SOB, along with taking temperature via forehead thermometer. Patient presented with all negative signs and symptoms and had no fever >100 degrees Fahrenheit this date. Exercises: (No more than 4 columns)   Exercise/Equipment 10/3/20 #1 Date Date           WARM UP         Pendumuls x1'           TABLE      Flex slides X10, 3-5\"     Scaption slides X10, 3-5\"     ER table slide/stretch X10, 3-5\"     Scap retractions X10, 5\"              STANDING                                                     PROPRIOCEPTION                                    MODALITIES                      Other Therapeutic Activities/Education:  HEP and importance of completion, POC and goals, anatomy and physiology related to condition. PT reviewed precautions for post op with patient and that AROM is currently not allowed. Home Exercise Program:    10/3: table slide flex, table slide scaption, table stretch ER, pendulums, scap retractions    Manual Treatments:  STM and trigger point release R scapular musculature. PROM R shoulder flex/ abd/ ER/ IR within pain limits    Modalities:  13' Vaso low pressure R shoulder in seated, no adverse skin reactions or complications after completion      Communication with other providers:  POC sent      Assessment:  0/10 end pain. Pt tolerated today's treatment without any adverse reactions or complications this date. Assessment: Pt is a 59-year-old male who underwent a RIGHT SHOULDER ARTHROSCOPY ROTATOR CUFF REPAIR (for Supraspinatus tear), SUBACROMIAL DECOMPRESSION and biceps tenotomy on 8/31/20.  Pt presents with limitations in ROM of R shoulder, RUE weakness, inability to perform work related tasks, reduced independence with ADL tasks and R shoulder pain. Pt would benefit from skilled therapy interventions to address listed impairments, progress toward goal completion, and improve ADL independence. PT also warranted to reduce risk for re-injury or future decline. Patient agrees with established plan of care and assisted in the development of their short term and long term goals. Patient had no adverse reaction with initial treatment and there are no barriers to learning. Demonstrates no mental or cognitive disorder.        Plan for Next Session: PROM only first 6 weeks - progress per protocol in chart      Time In / Time Out:     8298-0085      Timed Code/Total Treatment Minutes:  60': 8' TE x1, 15' MT x1, 15' Vaso x1, 22' Eval x1      Next Progress Note due:  10th visit    Plan of Care Interventions:  [x] Therapeutic Exercise  [x] Modalities:  [x] Therapeutic Activity     [x] Ultrasound  [x] Estim  [] Gait Training      [] Cervical Traction [] Lumbar Traction  [x] Neuromuscular Re-education    [x] Cold/hotpack [] Iontophoresis   [x] Instruction in HEP      [x] Vasopneumatic   [] Dry Needling    [x] Manual Therapy               [] Aquatic Therapy              Electronically signed by:  Akiko Willard, PT, DPT 10/3/2020, 11:40 AM

## 2020-10-09 ENCOUNTER — HOSPITAL ENCOUNTER (OUTPATIENT)
Dept: PHYSICAL THERAPY | Age: 65
Setting detail: THERAPIES SERIES
Discharge: HOME | End: 2020-10-09
Payer: OTHER GOVERNMENT

## 2020-10-09 PROCEDURE — 97110 THERAPEUTIC EXERCISES: CPT

## 2020-10-09 PROCEDURE — 97016 VASOPNEUMATIC DEVICE THERAPY: CPT

## 2020-10-09 PROCEDURE — 97140 MANUAL THERAPY 1/> REGIONS: CPT

## 2020-10-09 NOTE — FLOWSHEET NOTE
Outpatient Physical Therapy  [place]           [x] Phone: [phone]   Fax: [phone]  [place]           [] Phone: [phone]   Fax: [phone]        Physical Therapy Daily Treatment Note  Date:  10/9/2020    Patient Name:  Troy Church    :  1955  MRN: [id number]  Restrictions/Precautions:   RTC repair precautions - see protocol in chart  Diagnosis:   Diagnosis: Traumatic complete tear of R RTC  Date of Injury/Surgery: 20  Treatment Diagnosis: Treatment Diagnosis: R shoulder pain, RUE weakness, reduced R shoulder ROM    Insurance/Certification information: PT Insurance Information: R   Referring Physician:  Referring Practitioner: Sulaiman York MD  Next Doctor Visit:  10/13  Plan of care signed (Y/N): yes    Outcome Measure: Quick Dash: 43.18%  Visit# / total visits:    Pain level: 4/10   Goals:       Short term goals  Time Frame for Short term goals: 4 weeks  Short term goal 1: Pt will acheive full PROM in all directions R shoulder  Short term goal 2: Pt will report 4 consecutive hours of sleep in bed or better with no interuption due to pain  Long term goals  Time Frame for Long term goals : 12 weeks  Long term goal 1: Pt will have full AROM in all directions R shoulder to aide in ADLs  Long term goal 2: Pt will improve RUE strength gross 4+/5 or higher to aide in work related tasks  Long term goal 3: Pt will improve Quick Dash to 33% or lower to show [address] and subjective improvement    Summary of Evaluation: Assessment: Pt is a 71-year-old male who underwent a RIGHT SHOULDER ARTHROSCOPY ROTATOR CUFF REPAIR (for Supraspinatus tear), SUBACROMIAL DECOMPRESSION and biceps tenotomy on 20. Pt presents with limitations in ROM of R shoulder, RUE weakness, inability to perform work related tasks, reduced independence with ADL tasks and R shoulder pain.  Pt would benefit from skilled therapy interventions to address listed impairments, progress toward goal completion, and improve ADL independence. PT also warranted to reduce risk for re-injury or future decline. Subjective: Pt stated that his shoulder pain was about 4/10 today. PT stated that he decided to stop wearing his sling. Pt stated that he has been doing his HEP. Any changes in Ambulatory Summary Sheet? None        Objective:   Prior to today's treatment session, patient was screened for signs and symptoms related to COVID-19 including but not limited to verbally answering questions related to feeling ill, cough, or SOB, along with taking temperature via forehead thermometer. Patient presented with all negative signs and symptoms and had no fever >100 degrees Fahrenheit this date. PROM shoulder flexion 134°    Exercises: (No more than 4 columns)   Exercise/Equipment 10/3/20 #1 10/9/2020 #2 Date           WARM UP         Pendumuls x1' x1'          TABLE      Flex slides X10, 3-5\" 15x x 5\"    Scaption slides X10, 3-5\" 15x 5\"    ER table slide/stretch X10, 3-5\" 15x 5\"    Scap retractions X10, 5\" 10x2 5\"              STANDING                                                     PROPRIOCEPTION                                    MODALITIES                      Other Therapeutic Activities/Education:  HEP and importance of completion, POC and goals, anatomy and physiology related to condition. PT reviewed precautions for post op with patient and that AROM is currently not allowed. Home Exercise Program:    10/3: table slide flex, table slide scaption, table stretch ER, pendulums, scap retractions    Manual Treatments:  STM and trigger point release R scapular musculature. PROM R shoulder flex/ abd/ ER/ IR within pain limits    Modalities:  13' Vaso low pressure R shoulder in seated, no adverse skin reactions or complications after completion      Communication with other providers:  POC sent      Assessment:  Pt tolerated treatment fairly well. Pt was not able to get as much PROM shoulder flexion today.  Pt rated pain at 0/10 after vaso. Pt will continue to benefit from more therapy to increase ROM and strength as per protocol. Assessment: Pt is a 60-year-old male who underwent a RIGHT SHOULDER ARTHROSCOPY ROTATOR CUFF REPAIR (for Supraspinatus tear), SUBACROMIAL DECOMPRESSION and biceps tenotomy on 8/31/20. Pt presents with limitations in ROM of R shoulder, RUE weakness, inability to perform work related tasks, reduced independence with ADL tasks and R shoulder pain. Pt would benefit from skilled therapy interventions to address listed impairments, progress toward goal completion, and improve ADL independence. PT also warranted to reduce risk for re-injury or future decline. Patient agrees with established plan of care and assisted in the development of their short term and long term goals. Patient had no adverse reaction with initial treatment and there are no barriers to learning. Demonstrates no mental or cognitive disorder.        Plan for Next Session: PROM only first 6 weeks - progress per protocol in chart      Time In / Time Out:  0841/0921   11' late    Timed Code/Total Treatment Minutes:  30'/40' 1 man (15') 1 TE (15') 1 vaso (10')    Next Progress Note due:  10th visit    Plan of Care Interventions:  [x] Therapeutic Exercise  [x] Modalities:  [x] Therapeutic Activity     [x] Ultrasound  [x] Estim  [] Gait Training      [] Cervical Traction [] Lumbar Traction  [x] Neuromuscular Re-education    [x] Cold/hotpack [] Iontophoresis   [x] Instruction in HEP      [x] Vasopneumatic   [] Dry Needling    [x] Manual Therapy               [] Aquatic Therapy              Electronically signed by:  Leila Mairna PTA  10/9/2020, 8:24 AM     10/9/2020,12:39 PM

## 2020-10-13 ENCOUNTER — OFFICE VISIT (OUTPATIENT)
Dept: ORTHOPEDIC SURGERY | Age: 65
End: 2020-10-13

## 2020-10-13 VITALS
WEIGHT: 174 LBS | BODY MASS INDEX: 28.99 KG/M2 | HEIGHT: 65 IN | OXYGEN SATURATION: 96 % | RESPIRATION RATE: 15 BRPM | HEART RATE: 76 BPM

## 2020-10-13 PROCEDURE — 99024 POSTOP FOLLOW-UP VISIT: CPT | Performed by: ORTHOPAEDIC SURGERY

## 2020-10-13 RX ORDER — IBUPROFEN 800 MG/1
800 TABLET ORAL 3 TIMES DAILY PRN
Qty: 90 TABLET | Refills: 1 | Status: SHIPPED | OUTPATIENT
Start: 2020-10-13 | End: 2020-11-19 | Stop reason: ALTCHOICE

## 2020-10-13 NOTE — PATIENT INSTRUCTIONS
Continue weight-bearing as tolerated. Continue range of motion exercises as instructed. Ice and elevate as needed. Tylenol or Motrin for pain.   Follow up in 6 weeks

## 2020-10-17 ENCOUNTER — HOSPITAL ENCOUNTER (OUTPATIENT)
Dept: PHYSICAL THERAPY | Age: 65
Setting detail: THERAPIES SERIES
Discharge: HOME | End: 2020-10-17
Payer: OTHER GOVERNMENT

## 2020-10-17 PROCEDURE — 97140 MANUAL THERAPY 1/> REGIONS: CPT

## 2020-10-17 PROCEDURE — 97016 VASOPNEUMATIC DEVICE THERAPY: CPT

## 2020-10-17 PROCEDURE — 97110 THERAPEUTIC EXERCISES: CPT

## 2020-10-17 NOTE — FLOWSHEET NOTE
Outpatient Physical Therapy  [facility]           [x] Phone: [phone]   Fax: [phone]  [facility]           [] Phone: [phone]   Fax: [phone]        Physical Therapy Daily Treatment Note  Date:  10/17/2020    Patient Name:  Lazarus Avena    :  1955  MRN: [id number]  Restrictions/Precautions:   RTC repair precautions - see protocol in chart  Diagnosis:   Diagnosis: Traumatic complete tear of R RTC  Date of Injury/Surgery: 20  Treatment Diagnosis: Treatment Diagnosis: R shoulder pain, RUE weakness, reduced R shoulder ROM    Insurance/Certification information: PT Insurance Information: R   Referring Physician:  Referring Practitioner: Víctor Lawrence MD  Next Doctor Visit:  10/13  Plan of care signed (Y/N): yes    Outcome Measure: Quick Dash: 43.18%  Visit# / total visits:  3 /24  Pain level: 2-3/10   Goals:       Short term goals  Time Frame for Short term goals: 4 weeks  Short term goal 1: Pt will acheive full PROM in all directions R shoulder  Short term goal 2: Pt will report 4 consecutive hours of sleep in bed or better with no interuption due to pain  Long term goals  Time Frame for Long term goals : 12 weeks  Long term goal 1: Pt will have full AROM in all directions R shoulder to aide in ADLs  Long term goal 2: Pt will improve RUE strength gross 4+/5 or higher to aide in work related tasks  Long term goal 3: Pt will improve Quick Dash to 33% or lower to show [address] and subjective improvement    Summary of Evaluation: Assessment: Pt is a 40-year-old male who underwent a RIGHT SHOULDER ARTHROSCOPY ROTATOR CUFF REPAIR (for Supraspinatus tear), SUBACROMIAL DECOMPRESSION and biceps tenotomy on 20. Pt presents with limitations in ROM of R shoulder, RUE weakness, inability to perform work related tasks, reduced independence with ADL tasks and R shoulder pain.  Pt would benefit from skilled therapy interventions to address listed impairments, progress toward goal completion, and improve ADL independence. PT also warranted to reduce risk for re-injury or future decline. Subjective: Patient states that his shoulder is a little sore today. Rates his pain 2-3/10. Reports compliance with his HEP. Did a little bit of yard work yesterday. Any changes in Ambulatory Summary Sheet? None        Objective:   Prior to today's treatment session, patient was screened for signs and symptoms related to COVID-19 including but not limited to verbally answering questions related to feeling ill, cough, or SOB, along with taking temperature via forehead thermometer. Patient presented with all negative signs and symptoms and had no fever >100 degrees Fahrenheit this date. PROM shoulder flexion 160°    Exercises: (No more than 4 columns)   Exercise/Equipment 10/3/20 #1 10/9/2020 #2 Date  10/17/2020             WARM UP         Pendumuls x1' x1' 1 min          TABLE      Flex slides X10, 3-5\" 15x x 5\" 15x5\"   Scaption slides X10, 3-5\" 15x 5\" 15x5\"    ER table slide/stretch X10, 3-5\" 15x 5\" 15x5\"   Scap retractions X10, 5\" 10x2 5\"  10x2, 5\"             STANDING                                                     PROPRIOCEPTION                                    MODALITIES                      Other Therapeutic Activities/Education:  HEP and importance of completion, POC and goals, anatomy and physiology related to condition. PT reviewed precautions for post op with patient and that AROM is currently not allowed. Home Exercise Program:    10/3: table slide flex, table slide scaption, table stretch ER, pendulums, scap retractions    Manual Treatments:  STM and trigger point release R scapular musculature. PROM R shoulder flex/ abd/ ER/ IR within pain limits    Modalities:  13' Vaso low pressure R shoulder in seated, no adverse skin reactions or complications after completion      Communication with other providers:  POC sent      Assessment: Patient denied any pain in the shoulder after vaso. Shoulder flexion PROM greatly improved over last measurement. Assessment: Pt is a 17-year-old male who underwent a RIGHT SHOULDER ARTHROSCOPY ROTATOR CUFF REPAIR (for Supraspinatus tear), SUBACROMIAL DECOMPRESSION and biceps tenotomy on 8/31/20. Pt presents with limitations in ROM of R shoulder, RUE weakness, inability to perform work related tasks, reduced independence with ADL tasks and R shoulder pain. Pt would benefit from skilled therapy interventions to address listed impairments, progress toward goal completion, and improve ADL independence. PT also warranted to reduce risk for re-injury or future decline. Patient agrees with established plan of care and assisted in the development of their short term and long term goals. Patient had no adverse reaction with initial treatment and there are no barriers to learning. Demonstrates no mental or cognitive disorder.        Plan for Next Session: PROM only first 6 weeks - progress per protocol in chart      Time In / Time Out:  6001/2939    Timed Code/Total Treatment Minutes: 48' (15' TE, 15' Vaso, 20' MT)    Next Progress Note due:  10th visit    Plan of Care Interventions:  [x] Therapeutic Exercise  [x] Modalities:  [x] Therapeutic Activity     [x] Ultrasound  [x] Estim  [] Gait Training      [] Cervical Traction [] Lumbar Traction  [x] Neuromuscular Re-education    [x] Cold/hotpack [] Iontophoresis   [] Instruction in HEP      [x] Vasopneumatic   [] Dry Needling    [x] Manual Therapy               [] Aquatic Therapy              Electronically signed by:  Tra Cline PTA  10/17/2020, 7:54 AM     10/17/2020,7:54 AM

## 2020-10-20 ENCOUNTER — HOSPITAL ENCOUNTER (OUTPATIENT)
Dept: PHYSICAL THERAPY | Age: 65
Setting detail: THERAPIES SERIES
Discharge: HOME | End: 2020-10-20
Payer: OTHER GOVERNMENT

## 2020-10-20 PROCEDURE — 97110 THERAPEUTIC EXERCISES: CPT

## 2020-10-20 PROCEDURE — 97016 VASOPNEUMATIC DEVICE THERAPY: CPT

## 2020-10-20 PROCEDURE — 97140 MANUAL THERAPY 1/> REGIONS: CPT

## 2020-10-20 NOTE — FLOWSHEET NOTE
Outpatient Physical Therapy  [facility]           [x] Phone: [phone]   Fax: [phone]  [facility]           [] Phone: [phone]   Fax: [phone]        Physical Therapy Daily Treatment Note  Date:  10/20/2020    Patient Name:  Devora Ruiz    :  1955  MRN: [id number]  Restrictions/Precautions:   RTC repair precautions - see protocol in chart  Diagnosis:   Diagnosis: Traumatic complete tear of R RTC  Date of Injury/Surgery: 20  Treatment Diagnosis: Treatment Diagnosis: R shoulder pain, RUE weakness, reduced R shoulder ROM    Insurance/Certification information: PT Insurance Information: UMR   Referring Physician:  Referring Practitioner: Laqueta Epley, MD  Next Doctor Visit:    Plan of care signed (Y/N): yes    Outcome Measure: Quick Dash: 43.18%  Visit# / total visits:    Pain level: 0/10   Goals:       Short term goals  Time Frame for Short term goals: 4 weeks  Short term goal 1: Pt will acheive full PROM in all directions R shoulder  Short term goal 2: Pt will report 4 consecutive hours of sleep in bed or better with no interuption due to pain  Long term goals  Time Frame for Long term goals : 12 weeks  Long term goal 1: Pt will have full AROM in all directions R shoulder to aide in ADLs  Long term goal 2: Pt will improve RUE strength gross 4+/5 or higher to aide in work related tasks  Long term goal 3: Pt will improve Quick Dash to 33% or lower to show [address] and subjective improvement    Summary of Evaluation: Assessment: Pt is a 49-year-old male who underwent a RIGHT SHOULDER ARTHROSCOPY ROTATOR CUFF REPAIR (for Supraspinatus tear), SUBACROMIAL DECOMPRESSION and biceps tenotomy on 20. Pt presents with limitations in ROM of R shoulder, RUE weakness, inability to perform work related tasks, reduced independence with ADL tasks and R shoulder pain.  Pt would benefit from skilled therapy interventions to address listed impairments, progress toward goal completion, and Treatments:  STM and trigger point release R scapular musculature. PROM R shoulder flex/ abd/ ER/ IR within pain limits x 10'    Modalities:  10' Vaso low pressure R shoulder in seated, no adverse skin reactions or complications after completion      Communication with other providers:  POC sent      Assessment: Pt tolerated treatment well. Pt is making gains with ROM. Started AAROM w/ wand today. No increased report of pain. Pt rated pain at 0/10 after vaso. Pt will continue to benefit from more ROM and strengthening as per protocol. Assessment: Pt is a 59-year-old male who underwent a RIGHT SHOULDER ARTHROSCOPY ROTATOR CUFF REPAIR (for Supraspinatus tear), SUBACROMIAL DECOMPRESSION and biceps tenotomy on 8/31/20. Pt presents with limitations in ROM of R shoulder, RUE weakness, inability to perform work related tasks, reduced independence with ADL tasks and R shoulder pain. Pt would benefit from skilled therapy interventions to address listed impairments, progress toward goal completion, and improve ADL independence. PT also warranted to reduce risk for re-injury or future decline. Patient agrees with established plan of care and assisted in the development of their short term and long term goals. Patient had no adverse reaction with initial treatment and there are no barriers to learning. Demonstrates no mental or cognitive disorder.        Plan for Next Session: PROM only first 6 weeks - progress per protocol in chart      Time In / Time Out: 1349/1437    Timed Code/Total Treatment Minutes: 38'/ 50' 1 man ( 10') 2 TE ( 28') 1 vaso (10')     Next Progress Note due:  10th visit    Plan of Care Interventions:  [x] Therapeutic Exercise  [x] Modalities:  [x] Therapeutic Activity     [x] Ultrasound  [x] Estim  [] Gait Training      [] Cervical Traction [] Lumbar Traction  [x] Neuromuscular Re-education    [x] Cold/hotpack [] Iontophoresis   [] Instruction in HEP      [x] Vasopneumatic   [] Dry Needling    [x] Manual

## 2020-10-22 ENCOUNTER — HOSPITAL ENCOUNTER (OUTPATIENT)
Dept: PHYSICAL THERAPY | Age: 65
Setting detail: THERAPIES SERIES
Discharge: HOME | End: 2020-10-22
Payer: OTHER GOVERNMENT

## 2020-10-22 PROCEDURE — 97112 NEUROMUSCULAR REEDUCATION: CPT

## 2020-10-22 PROCEDURE — 97110 THERAPEUTIC EXERCISES: CPT

## 2020-10-22 PROCEDURE — 97016 VASOPNEUMATIC DEVICE THERAPY: CPT

## 2020-10-22 NOTE — FLOWSHEET NOTE
Outpatient Physical Therapy  [facility]           [x] Phone: [phone]   Fax: [phone]  [facility]           [] Phone: [phone]   Fax: [phone]        Physical Therapy Daily Treatment Note  Date:  10/22/2020    Patient Name:  Chano Meneses    :  1955  MRN: [id number]  Restrictions/Precautions:   RTC repair precautions - see protocol in chart  Diagnosis:   Diagnosis: Traumatic complete tear of R RTC  Date of Injury/Surgery: 20  Treatment Diagnosis: Treatment Diagnosis: R shoulder pain, RUE weakness, reduced R shoulder ROM    Insurance/Certification information: PT Insurance Information: R   Referring Physician:  Referring Practitioner: Alexey Durand MD  Next Doctor Visit:    Plan of care signed (Y/N): yes  Outcome Measure: Quick Dash: 43.18%  Visit# / total visits:    Pain level: 0/10   Goals:       Short term goals  Time Frame for Short term goals: 4 weeks  Short term goal 1: Pt will acheive full PROM in all directions R shoulder: Progressing 10/22  Short term goal 2: Pt will report 4 consecutive hours of sleep in bed or better with no interuption due to pain: Progressing 10/22  Long term goals  Time Frame for Long term goals : 12 weeks  Long term goal 1: Pt will have full AROM in all directions R shoulder to aide in ADLs  Long term goal 2: Pt will improve RUE strength gross 4+/5 or higher to aide in work related tasks  Long term goal 3: Pt will improve Quick Dash to 33% or lower to show [address] and subjective improvement    Summary of Evaluation: Assessment: Pt is a 54-year-old male who underwent a RIGHT SHOULDER ARTHROSCOPY ROTATOR CUFF REPAIR (for Supraspinatus tear), SUBACROMIAL DECOMPRESSION and biceps tenotomy on 20. Pt presents with limitations in ROM of R shoulder, RUE weakness, inability to perform work related tasks, reduced independence with ADL tasks and R shoulder pain.  Pt would benefit from skilled therapy interventions to address listed impairments, progress toward goal completion, and improve ADL independence. PT also warranted to reduce risk for re-injury or future decline. Subjective: Pt states he is doing well this date. He is having better motion at his shoulder with less pain. Any changes in Ambulatory Summary Sheet? None        Objective:   Prior to today's treatment session, patient was screened for signs and symptoms related to COVID-19 including but not limited to verbally answering questions related to feeling ill, cough, or SOB, along with taking temperature via forehead thermometer. Patient presented with all negative signs and symptoms and had no fever >100 degrees Fahrenheit this date. AROM R shoulder:   ER: 59 deg   IR: WFL   Flex: WFL    Abd: ~145 deg (in proper anatomical position)    Exercises: (No more than 4 columns)   Exercise/Equipment Date  10/17/2020   10/20/ 2020 #4 10/22/20 #5           WARM UP        Pendumuls 1 min  1 min      Pulley   Flex x12, 5\"  abd x12, 5\"      UBE   2'/2' self paced at 60   TABLE      Flex slides 15x5\" 15x 5\"    Scaption slides 15x5\"  15x 5\"     ER table slide/stretch 15x5\" 15x5\"    Scap retractions 10x2, 5\"  10x2, 5\"     Wand press ups   10x2 x10   Supine abduction w/ wand  10x2    W/ cane ER  10x2 5\" x10    Flexion w/  Cane  10x2 x10   Sidelying ER   1#, 2x8   AROM   x10 each, flex/ abd         STANDING      Flex/ scaption   1# against wall. 2x10 flex. x10 scap   TB rows   OTB x12                                      PROPRIOCEPTION                                    MODALITIES      vaso  10'  15'                   Other Therapeutic Activities/Education: HEP and continuation    Home Exercise Program:    10/3: table slide flex, table slide scaption, table stretch ER, pendulums, scap retractions.  Added wand flexion, wand ER, and wand abduction 10/20/2020  10/22: AROM flex and abd, TB rows    Manual Treatments:      Modalities:  15' Vaso low pressure R shoulder in seated, no adverse skin reactions or complications after completion      Communication with other providers:  POC sent      Assessment: Pt tolerated today's treatment without any adverse reactions or complications this date. Pt tolerated all added AROM and strength exercises well with no increase in pain or issues after tx this date. Pt would continue to benefit from skilled therapy interventions to address remaining impairments, improve mobility and strength and progress toward goal completion while reducing risk for re-injury or further decline.   End pain:0/10    Plan for Next Session:  progress per protocol in chart    Time In / Time Out:  6590-9301    Timed Code/Total Treatment Minutes: 38'/ 48' :  15' NMR x1, 24' TE x2, 1 vaso x10'    Next Progress Note due:  10th visit    Plan of Care Interventions:  [x] Therapeutic Exercise  [x] Modalities:  [x] Therapeutic Activity     [x] Ultrasound  [x] Estim  [] Gait Training      [] Cervical Traction [] Lumbar Traction  [x] Neuromuscular Re-education    [x] Cold/hotpack [] Iontophoresis   [] Instruction in HEP      [x] Vasopneumatic   [] Dry Needling    [x] Manual Therapy               [] Aquatic Therapy              Electronically signed by:  Esperanza Serna PT, DPT  10/22/2020, 10:32 AM      10/22/2020,10:32 AM

## 2020-10-27 ENCOUNTER — HOSPITAL ENCOUNTER (OUTPATIENT)
Dept: PHYSICAL THERAPY | Age: 65
Setting detail: THERAPIES SERIES
Discharge: HOME | End: 2020-10-27
Payer: OTHER GOVERNMENT

## 2020-10-27 PROCEDURE — 97016 VASOPNEUMATIC DEVICE THERAPY: CPT

## 2020-10-27 PROCEDURE — 97112 NEUROMUSCULAR REEDUCATION: CPT

## 2020-10-27 PROCEDURE — 97110 THERAPEUTIC EXERCISES: CPT

## 2020-10-27 NOTE — FLOWSHEET NOTE
progress toward goal completion, and improve ADL independence. PT also warranted to reduce risk for re-injury or future decline. Subjective: Pt stated that his pain was more of an ache and he rated it at 3/10 today. Any changes in Ambulatory Summary Sheet? None        Objective:   Prior to today's treatment session, patient was screened for signs and symptoms related to COVID-19 including but not limited to verbally answering questions related to feeling ill, cough, or SOB, along with taking temperature via forehead thermometer. Patient presented with all negative signs and symptoms and had no fever >100 degrees Fahrenheit this date. AROM R shoulder: supine   Flexion 168°  Abduction 158°  ER 65°  IR 75°     Exercises: (No more than 4 columns)   Exercise/Equipment Date  10/17/2020   10/20/ 2020 #4 10/22/20 #5 10/27/2020 #6            WARM UP         Pendulums 1 min  1 min       P[place]ley   Flex x12, 5\"  abd x12, 5\" Flex 15x 5\"  Abd 15x 5\"        UBE   2'/2' self paced at 61 3'/3' 120 P/AAROM   TABLE       Flex slides 15x5\" 15x 5\"     Scaption slides 15x5\"  15x 5\"      ER table slide/stretch 15x5\" 15x5\"     Scap retractions 10x2, 5\"  10x2, 5\"      Wand press ups   10x2 x10 10x2   Supine abduction w/ wand  10x2     W/ cane ER  10x2 5\" x10 10x2 5\"    Flexion w/  Cane  10x2 x10 10x2   Sidelying ER   1#, 2x8 0# 15x   AROM   x10 each, flex/ abd           STANDING       Flex/ scaption   1# against wall. 2x10 flex. x10 scap Against the wall  1# flexion 10x2  1# 10x2   TB rows   OTB x12 OTB 10x2                                           PROPRIOCEPTION                                          MODALITIES       vaso  10'  15' 15'                     Other Therapeutic Activities/Education: HEP and continuation    Home Exercise Program:   NM,./    10/3: table slide flex, table slide scaption, table stretch ER, pendulums, scap retractions.  Added wand flexion, wand ER, and wand abduction 10/20/2020  10/22: AROM flex and abd, TB rows    Manual Treatments:      Modalities:  13' Vaso low pressure R shoulder in seated, no adverse skin reactions or complications after completion      Communication with other providers:  POC sent      Assessment: Pt tolerated today's treatment well. Pt is making nice gains with strength and ROM. Pt would continue to benefit from skilled therapy interventions to address remaining impairments, improve mobility and strength and progress toward goal completion while reducing risk for re-injury or further decline.   End pain:0/10    Plan for Next Session:  progress per protocol in chart    Time In / Time Out:  1300/1353     Timed Code/Total Treatment Minutes: :38'/ 48' 2 TE (28') 1 neuro (10') 1 vaso ( 15')     Next Progress Note due:  10th visit    Plan of Care Interventions:  [x] Therapeutic Exercise  [x] Modalities:  [x] Therapeutic Activity     [x] Ultrasound  [x] Estim  [] Gait Training      [] Cervical Traction [] Lumbar Traction  [x] Neuromuscular Re-education    [x] Cold/hotpack [] Iontophoresis   [] Instruction in HEP      [x] Vasopneumatic   [] Dry Needling    [x] Manual Therapy               [] Aquatic Therapy              Electronically signed by:  Derek Townsend PTA  10/27/2020, 11:08 AM     10/27/2020,7:31 PM

## 2020-10-29 ENCOUNTER — HOSPITAL ENCOUNTER (OUTPATIENT)
Dept: PHYSICAL THERAPY | Age: 65
Setting detail: THERAPIES SERIES
Discharge: HOME | End: 2020-10-29
Payer: OTHER GOVERNMENT

## 2020-10-29 PROCEDURE — 97112 NEUROMUSCULAR REEDUCATION: CPT

## 2020-10-29 PROCEDURE — 97110 THERAPEUTIC EXERCISES: CPT

## 2020-10-29 PROCEDURE — 97016 VASOPNEUMATIC DEVICE THERAPY: CPT

## 2020-10-29 NOTE — FLOWSHEET NOTE
Outpatient Physical Therapy  [facility]           [x] Phone: [phone]   Fax: [phone]  [facility]           [] Phone: [phone]   Fax: [phone]        Physical Therapy Daily Treatment Note  Date:  10/29/2020    Patient Name:  Lisa Rodas    :  1955  MRN: [id number]  Restrictions/Precautions:   RTC repair precautions - see protocol in chart  Diagnosis:   Diagnosis: Traumatic complete tear of R RTC  Date of Injury/Surgery: 20  Treatment Diagnosis: Treatment Diagnosis: R shoulder pain, RUE weakness, reduced R shoulder ROM    Insurance/Certification information: PT Insurance Information: R   Referring Physician:  Referring Practitioner: Romina Zafar MD  Next Doctor Visit:    Plan of care signed (Y/N): yes  Outcome Measure: Quick Dash: 43.18%  Visit# / total visits:    Pain level: 3 /10 achy  Goals:       Short term goals  Time Frame for Short term goals: 4 weeks  Short term goal 1: Pt will acheive full PROM in all directions R shoulder: Progressing 10/22  Short term goal 2: Pt will report 4 consecutive hours of sleep in bed or better with no interuption due to pain: Progressing 10/22  Long term goals  Time Frame for Long term goals : 12 weeks  Long term goal 1: Pt will have full AROM in all directions R shoulder to aide in ADLs  Long term goal 2: Pt will improve RUE strength gross 4+/5 or higher to aide in work related tasks  Long term goal 3: Pt will improve Quick Dash to 33% or lower to show [address] and subjective improvement    Summary of Evaluation: Assessment: Pt is a 77-year-old male who underwent a RIGHT SHOULDER ARTHROSCOPY ROTATOR CUFF REPAIR (for Supraspinatus tear), SUBACROMIAL DECOMPRESSION and biceps tenotomy on 20. Pt presents with limitations in ROM of R shoulder, RUE weakness, inability to perform work related tasks, reduced independence with ADL tasks and R shoulder pain.  Pt would benefit from skilled therapy interventions to address listed impairments,

## 2020-11-10 ENCOUNTER — HOSPITAL ENCOUNTER (OUTPATIENT)
Dept: PHYSICAL THERAPY | Age: 65
Setting detail: THERAPIES SERIES
Discharge: HOME | End: 2020-11-10
Payer: OTHER GOVERNMENT

## 2020-11-10 PROCEDURE — 97016 VASOPNEUMATIC DEVICE THERAPY: CPT

## 2020-11-10 PROCEDURE — 97112 NEUROMUSCULAR REEDUCATION: CPT

## 2020-11-10 PROCEDURE — 97110 THERAPEUTIC EXERCISES: CPT

## 2020-11-10 NOTE — FLOWSHEET NOTE
Outpatient Physical Therapy  [place]           [x] Phone: [phone]   Fax: [phone]  [facility]           [] Phone: [phone]   Fax: [phone]        Physical Therapy Daily Treatment Note  Date:  11/10/2020    Patient Name:  Anupama Lira    :  1955  MRN: [id number]  Restrictions/Precautions:   RTC repair precautions - see protocol in chart  Diagnosis:   Diagnosis: Traumatic complete tear of R RTC  Date of Injury/Surgery: 20  Treatment Diagnosis: Treatment Diagnosis: R shoulder pain, RUE weakness, reduced R shoulder ROM    Insurance/Certification information: PT Insurance Information: R   Referring Physician:  Referring Practitioner: Lavinia Martin MD  Next Doctor Visit:    Plan of care signed (Y/N): yes  Outcome Measure: Quick Dash: 43.18%  Visit# / total visits:    Pain level: 0 /10 achy  Goals:       Short term goals  Time Frame for Short term goals: 4 weeks  Short term goal 1: Pt will acheive full PROM in all directions R shoulder: Progressing 10/22  Short term goal 2: Pt will report 4 consecutive hours of sleep in bed or better with no interuption due to pain: Progressing 10/22  Long term goals  Time Frame for Long term goals : 12 weeks  Long term goal 1: Pt will have full AROM in all directions R shoulder to aide in ADLs  Long term goal 2: Pt will improve RUE strength gross 4+/5 or higher to aide in work related tasks  Long term goal 3: Pt will improve Quick Dash to 33% or lower to show [address] and subjective improvement    Summary of Evaluation: Assessment: Pt is a 77-year-old male who underwent a RIGHT SHOULDER ARTHROSCOPY ROTATOR CUFF REPAIR (for Supraspinatus tear), SUBACROMIAL DECOMPRESSION and biceps tenotomy on 20. Pt presents with limitations in ROM of R shoulder, RUE weakness, inability to perform work related tasks, reduced independence with ADL tasks and R shoulder pain.  Pt would benefit from skilled therapy interventions to address listed impairments, progress toward goal completion, and improve ADL independence. PT also warranted to reduce risk for re-injury or future decline. Subjective: Pt states he is doing well this date. He is no longer having any issues with ADL completion but is still having a little pain with sleeping on his back. Any changes in Ambulatory Summary Sheet? None        Objective:   Prior to today's treatment session, patient was screened for signs and symptoms related to COVID-19 including but not limited to verbally answering questions related to feeling ill, cough, or SOB, along with taking temperature via forehead thermometer. Patient presented with all negative signs and symptoms and had no fever >100 degrees Fahrenheit this date.        AROM R shoulder: supine   Flexion 169°  Abduction 168°  ER 65°  IR 75°     Exercises: (No more than 4 columns)   Exercise/Equipment 10/27/2020 #6 10/29/20 #7 11/10/20 #8           WARM UP        Pendulums        Pulley Flex 15x 5\"  Abd 15x 5\"          UBE 3'/3' 120 P/AAROM nustep downstairs x4' 3'/3'   TABLE      Flex slides      Scaption slides      ER table slide/stretch      Ball on wall  x15 med/lat, up/down, clockwise, CC x20 med/lat, up/down, clockwise, CC   Wand press ups  10x2 X15, 3# bar X15, 3# bar   Supine abduction w/ wand      W/ cane ER 10x2 5\" X15, 3# bar x15 3#, x10 5#    Flexion w/  Cane 10x2 X15, 3# bar X15, 3# bar   Sidelying ER 0# 15x 2x10, 1# x10 + 15, 2#   AROM  x10 each, flex/ abd          STANDING      Flex/ scaption Against the wall  1# flexion 10x2  1# 10x2 Against the wall  1# flexion 10x2  1# 10x2 Against the wall  2# flexion 12x2  2# 12x2   TB rows OTB 10x2 GTB 2x10 Black TB 2x10   PNF  D1/D2 flex and ext x12 each, no weight in front of mirror D1/D2 flex and ext x12 each, no weight in front of mirror                                PROPRIOCEPTION                                    MODALITIES      vaso 15' 10' 15'                   Other Therapeutic Activities/Education: HEP and continuation    Home Exercise Program:    10/3: table slide flex, table slide scaption, table stretch ER, pendulums, scap retractions. Added wand flexion, wand ER, and wand abduction 10/20/2020  10/22: AROM flex and abd, TB rows    Manual Treatments:      Modalities:  15' Vaso low pressure R shoulder in seated, no adverse skin reactions or complications after completion    Communication with other providers:  POC sent    Assessment: Pt tolerated today's treatment without any adverse reactions or complications this date. Pt did well with increased weight this date and no pain increase. Pt would continue to benefit from skilled therapy interventions to address remaining impairments, improve mobility and strength and progress toward goal completion while reducing risk for re-injury or further decline.   End pain: 0/10    Plan for Next Session:  progress per protocol in chart    Time In / Time Out:  7360-0462    Timed Code/Total Treatment Minutes: : 34'/ 52' : 1 TE (19') 1 neuro (15') 1 vaso ( 15')     Next Progress Note due:  10th visit     Plan of Care Interventions:  [x] Therapeutic Exercise  [x] Modalities:  [x] Therapeutic Activity     [x] Ultrasound  [x] Estim  [] Gait Training      [] Cervical Traction [] Lumbar Traction  [x] Neuromuscular Re-education    [x] Cold/hotpack [] Iontophoresis   [x] Instruction in HEP      [x] Vasopneumatic   [] Dry Needling    [x] Manual Therapy               [] Aquatic Therapy              Electronically signed by:  Dorothy Campbell PT, DPT  11/10/2020, 8:53 AM     11/10/2020,8:53 AM

## 2020-11-12 ENCOUNTER — HOSPITAL ENCOUNTER (OUTPATIENT)
Dept: PHYSICAL THERAPY | Age: 65
Setting detail: THERAPIES SERIES
Discharge: HOME | End: 2020-11-12
Payer: OTHER GOVERNMENT

## 2020-11-12 PROCEDURE — 97110 THERAPEUTIC EXERCISES: CPT

## 2020-11-12 PROCEDURE — 97016 VASOPNEUMATIC DEVICE THERAPY: CPT

## 2020-11-12 PROCEDURE — 97112 NEUROMUSCULAR REEDUCATION: CPT

## 2020-11-12 NOTE — FLOWSHEET NOTE
Outpatient Physical Therapy  [place]           [x] Phone: [phone]   Fax: [phone]  [facility]           [] Phone: [phone]   Fax: [phone]        Physical Therapy Daily Treatment Note  Date:  2020    Patient Name:  Alise Santiago    :  1955  MRN: [id number]  Restrictions/Precautions:   RTC repair precautions - see protocol in chart  Diagnosis:   Diagnosis: Traumatic complete tear of R RTC  Date of Injury/Surgery: 20  Treatment Diagnosis: Treatment Diagnosis: R shoulder pain, RUE weakness, reduced R shoulder ROM    Insurance/Certification information: PT Insurance Information: R   Referring Physician:  Referring Practitioner: Earney Severs, MD  Next Doctor Visit:    Plan of care signed (Y/N): yes  Outcome Measure: Quick Dash: 13.63%  Visit# / total visits:    Pain level: 0 /10 achy  Goals:       Short term goals  Time Frame for Short term goals: 4 weeks  Short term goal 1: Pt will acheive full PROM in all directions R shoulder: GOAL MET   Short term goal 2: Pt will report 4 consecutive hours of sleep in bed or better with no interuption due to pain: GOAL MET   Long term goals  Time Frame for Long term goals : 12 weeks  Long term goal 1: Pt will have full AROM in all directions R shoulder to aide in ADLs: Progressing   Long term goal 2: Pt will improve RUE strength gross 4+/5 or higher to aide in work related tasks: Progressing   Long term goal 3: Pt will improve Quick Dash to 33% or lower to show [address] and subjective improvement: GOAL MET     Summary of Evaluation: Assessment: Pt is a 27-year-old male who underwent a RIGHT SHOULDER ARTHROSCOPY ROTATOR CUFF REPAIR (for Supraspinatus tear), SUBACROMIAL DECOMPRESSION and biceps tenotomy on 20. Pt presents with limitations in ROM of R shoulder, RUE weakness, inability to perform work related tasks, reduced independence with ADL tasks and R shoulder pain.  Pt would benefit from skilled therapy interventions to address listed impairments, progress toward goal completion, and improve ADL independence. PT also warranted to reduce risk for re-injury or future decline. Subjective: Pt states he is doing well this date. He is not having any issues with ADL completion at this time. He is able to reach over head with no issues and is not having issues with lifting objects at home at this time. He is set to go back at work the end of this month. Quick Dash: 13.63%    Any changes in Ambulatory Summary Sheet? None        Objective:   Prior to today's treatment session, patient was screened for signs and symptoms related to COVID-19 including but not limited to verbally answering questions related to feeling ill, cough, or SOB, along with taking temperature via forehead thermometer. Patient presented with all negative signs and symptoms and had no fever >100 degrees Fahrenheit this date.      PROM R shoulder: full all directions no pain  AROM R shoulder: supine   Flexion 180°  Abduction 180°  ER 67°  IR 75°     RUE MMT:   Shoulder flex: 4+/5   Shoulder abd: 4/5   Shoulder IR: 4+/5   Shoulder ER: 4/5   Elbow flex/ ext: 5/5    Exercises: (No more than 4 columns)   Exercise/Equipment 10/29/20 #7 11/10/20 #8 11/12/20 #9           WARM UP        Pendulums        Pulley         UBE nustep downstairs x4' 3'/3' nustep downstairs x5'   TABLE      Flex slides      Scaption slides      ER table slide/stretch      Ball on wall x15 med/lat, up/down, clockwise, CC x20 med/lat, up/down, clockwise, CC x15 med/lat, up/down, clockwise, CC : 2# ball   Wand press ups  X15, 3# bar X15, 3# bar X20, 4#   Supine abduction w/ wand      W/ cane ER X15, 3# bar x15 3#, x10 5# X15, 4#    Flexion w/  Cane X15, 3# bar X15, 3# bar X15, 4#   Sidelying ER 2x10, 1# x10 + 15, 2# 2x12, 4#   AROM x10 each, flex/ abd           STANDING      Flex/ scaption Against the wall  1# flexion 10x2  1# 10x2 Against the wall  2# flexion 12x2  2# 12x2 Against the wall  3# flexion x12  3# x12   TB rows GTB 2x10 Black TB 2x10 CC 22# 2x12   PNF D1/D2 flex and ext x12 each, no weight in front of mirror D1/D2 flex and ext x12 each, no weight in front of mirror D1/D2 flex and ext 2x12 each, RTB in front of mirror   Table top pushups   x10                          PROPRIOCEPTION                                    MODALITIES      vaso 10' 15' 10'                   Other Therapeutic Activities/Education: HEP and continuation, further POC and d/c plans    Home Exercise Program:    10/3: table slide flex, table slide scaption, table stretch ER, pendulums, scap retractions. Added wand flexion, wand ER, and wand abduction 10/20/2020  10/22: AROM flex and abd, TB rows    Manual Treatments:      Modalities:  10' Vaso low pressure R shoulder in seated, no adverse skin reactions or complications after completion    Communication with other providers:  POC sent    Assessment: Pt tolerated today's treatment without any adverse reactions or complications this date. Pt has shown very good progression since eval regarding improved ROM, strength and functional mobility at the R shoulder. He is not having issues with ADL completion at this time and is set to go back to work end of November. He is showing good goal progression. Pt would continue to benefit from skilled therapy interventions to address remaining impairments, improve mobility and strength and progress toward goal completion while reducing risk for re-injury or further decline.   End pain: 0/10    Plan for Next Session:  progress per protocol in chart    Time In / Time Out:  8383-5460    Timed Code/Total Treatment Minutes: : 40'/ 50' : 2 TE (23') 1 neuro (17') 1 vaso ( 10')     Next Progress Note due:  10th visit      Plan of Care Interventions:  [x] Therapeutic Exercise  [x] Modalities:  [x] Therapeutic Activity     [x] Ultrasound  [x] Estim  [] Gait Training      [] Cervical Traction [] Lumbar Traction  [x] Neuromuscular Re-education    [x] Cold/hotpack [] Iontophoresis   [x] Instruction in HEP      [x] Vasopneumatic   [] Dry Needling    [x] Manual Therapy               [] Aquatic Therapy              Electronically signed by:  Dorothy Campbell PT, DPT  11/12/2020, 3:15 PM     11/12/2020,3:15 PM

## 2020-11-12 NOTE — PROGRESS NOTES
Outpatient Physical Therapy           [place]           [x] Phone: [phone]   Fax: [phone]  [place]           [] Phone: [phone]   Fax: [phone]       To:   Weston Zeng MD    From: Aldo Alexander, PT, DPT     Patient: Karuna Jaimes                  : 1955  Diagnosis:    Traumatic complete tear of R RTC  Date: 2020  Treatment Diagnosis:   R shoulder pain, RUE weakness, reduced R shoulder ROM        [x]  Progress Note                []  Discharge Note    Evaluation Date:  10/03/20  Total Visits to date:  9 Cancels/No-shows to date:  0    Subjective:  Pt states he is doing well this date. He is not having any issues with ADL completion at this time. He is able to reach over head with no issues and is not having issues with lifting objects at home at this time. He is set to go back at work the end of this month. Quick Dash: 13.63%        Plan of Care/Treatment to date:  [x] Therapeutic Exercise    [x] Modalities:  [x] Therapeutic Activity     [x] Ultrasound  [x] Electrical Stimulation  [] Gait Training      [] Cervical Traction   [] Lumbar Traction  [x] Neuromuscular Re-education  [x] Cold/hotpack [] Iontophoresis  [x] Instruction in HEP      Other:  [x] Manual Therapy       [x]  Vasopneumatic  [] Aquatic Therapy       []                          Objective/Significant Findings At Last Visit/Comments:    PROM R shoulder: full all directions no pain  AROM R shoulder: supine   Flexion 180°  Abduction 180°  ER 67°  IR 75°       RUE MMT:              Shoulder flex: 4+/5              Shoulder abd: 4/5              Shoulder IR: 4+/5              Shoulder ER: 4/5              Elbow flex/ ext: 5/5    Assessment:   Pt has shown very good progression since eval regarding improved ROM, strength and functional mobility at the R shoulder. He is not having issues with ADL completion at this time and is set to go back to work end of November. He is showing good goal progression.  Pt would continue to benefit from skilled therapy interventions to address remaining impairments, improve mobility and strength and progress toward goal completion while reducing risk for re-injury or further decline. Goal Status:  [] Achieved [x] Partially Achieved  [] Not Achieved     Changes to goals:    Time Frame for Short term goals: 4 weeks  Short term goal 1: Pt will acheive full PROM in all directions R shoulder: GOAL MET 11/12  Short term goal 2: Pt will report 4 consecutive hours of sleep in bed or better with no interuption due to pain: GOAL MET 11/12  Long term goals  Time Frame for Long term goals : 12 weeks  Long term goal 1: Pt will have full AROM in all directions R shoulder to aide in ADLs: Progressing 11/12  Long term goal 2: Pt will improve RUE strength gross 4+/5 or higher to aide in work related tasks: Progressing 11/12  Long term goal 3: Pt will improve Quick Dash to 33% or lower to show [address] and subjective improvement: GOAL MET 11/12        Frequency/Duration:  # Days per week: [] 1 day # Weeks: [] 1 week [] 4 weeks [] 8 weeks     [x] 2 days   [] 2 weeks [] 5 weeks [] 10 weeks     [] 3 days   [] 3 weeks [] 6 weeks [x] 12 weeks       Rehab Potential: [] Excellent [x] Good [] Fair  [] Poor         Patient Status: [x] Continue per initial plan of Care     [] Patient now discharged     [] Additional visits requested, Please re-certify for additional visits:      Requested frequency/duration: /week for weeks    If we are requesting more visits, we fully anticipate the patient's condition is expected to improve within the treatment timeframe we are requesting. Electronically signed by:  Johan Nunez PT, DPT 11/12/2020, 6:48 PM    If you have any questions or concerns, please don't hesitate to call.   Thank you for your referral.    Physician Signature:______________________ Date:______ Time: ________  By signing above, therapists plan is approved by physician

## 2020-11-17 ENCOUNTER — HOSPITAL ENCOUNTER (OUTPATIENT)
Dept: PHYSICAL THERAPY | Age: 65
Setting detail: THERAPIES SERIES
Discharge: HOME | End: 2020-11-17
Payer: OTHER GOVERNMENT

## 2020-11-17 PROCEDURE — 97110 THERAPEUTIC EXERCISES: CPT

## 2020-11-17 PROCEDURE — 97016 VASOPNEUMATIC DEVICE THERAPY: CPT

## 2020-11-17 PROCEDURE — 97112 NEUROMUSCULAR REEDUCATION: CPT

## 2020-11-17 NOTE — FLOWSHEET NOTE
horizontal  2x30\" vertical                           MODALITIES      vaso 15' 10' 10'                   Other Therapeutic Activities/Education: HEP and continuation, further POC and d/c plans    Home Exercise Program:    10/3: table slide flex, table slide scaption, table stretch ER, pendulums, scap retractions. Added wand flexion, wand ER, and wand abduction 10/20/2020  10/22: AROM flex and abd, TB rows    Manual Treatments:      Modalities:  10' Vaso low pressure R shoulder in seated, no adverse skin reactions or complications after completion    Communication with other providers:  POC sent    Assessment: Pt tolerated today's treatment without any adverse reactions or complications this date. Pt demonstrated good tolerance with no added pain to all added strength exercises this date, min VC for form. Pt would continue to benefit from skilled therapy interventions to address remaining impairments, improve mobility and strength and progress toward goal completion while reducing risk for re-injury or further decline.   End pain: 0/10    Plan for Next Session:  progress per protocol in chart    Time In / Time Out:  2030-7822    Timed Code/Total Treatment Minutes: : 43'/ 48' : 2 TE (23') 1 neuro (20') 1 vaso ( 10')     Next Progress Note due:  10th visit      Plan of Care Interventions:  [x] Therapeutic Exercise  [x] Modalities:  [x] Therapeutic Activity     [x] Ultrasound  [x] Estim  [] Gait Training      [] Cervical Traction [] Lumbar Traction  [x] Neuromuscular Re-education    [x] Cold/hotpack [] Iontophoresis   [x] Instruction in HEP      [x] Vasopneumatic   [] Dry Needling    [x] Manual Therapy               [] Aquatic Therapy              Electronically signed by:  Aldo Alexander PT, DPT  11/17/2020, 7:27 AM     11/17/2020,7:27 AM

## 2020-11-19 ENCOUNTER — OFFICE VISIT (OUTPATIENT)
Dept: ORTHOPEDIC SURGERY | Age: 65
End: 2020-11-19

## 2020-11-19 ENCOUNTER — HOSPITAL ENCOUNTER (OUTPATIENT)
Dept: PHYSICAL THERAPY | Age: 65
Setting detail: THERAPIES SERIES
Discharge: HOME | End: 2020-11-19
Payer: OTHER GOVERNMENT

## 2020-11-19 VITALS
OXYGEN SATURATION: 96 % | BODY MASS INDEX: 28.99 KG/M2 | HEART RATE: 88 BPM | WEIGHT: 174 LBS | RESPIRATION RATE: 16 BRPM | HEIGHT: 65 IN

## 2020-11-19 PROCEDURE — 99024 POSTOP FOLLOW-UP VISIT: CPT | Performed by: ORTHOPAEDIC SURGERY

## 2020-11-19 PROCEDURE — 97110 THERAPEUTIC EXERCISES: CPT

## 2020-11-19 PROCEDURE — 97530 THERAPEUTIC ACTIVITIES: CPT

## 2020-11-19 PROCEDURE — 97016 VASOPNEUMATIC DEVICE THERAPY: CPT

## 2020-11-19 RX ORDER — IBUPROFEN 800 MG/1
800 TABLET ORAL 3 TIMES DAILY PRN
Qty: 60 TABLET | Refills: 1 | Status: SHIPPED | OUTPATIENT
Start: 2020-11-19

## 2020-11-19 NOTE — PATIENT INSTRUCTIONS
Continue to work on ROM and strengthening exercises   Take Ibuprofen 800 mg as needed for pain  Rest, ice, and elevate as needed  May return to work on Monday, November 23, 2020 with light duty restrictions to avoid heavy lifting, pushing, or pulling with the right shoulder greater than 40 lbs and no overhead lifting  Follow up in 2 months

## 2020-11-19 NOTE — LETTER
[facility]  [address] [facility] [address]  Phone: [phone]  Fax: [phone]    Magda Roblero MD        November 19, 2020     Patient: Prema Perez   YOB: 1955   Date of Visit: 11/19/2020       To Whom It May Concern: It is my medical opinion that Cha Soares may return to work on Monday, November 23, 2020 with light duty restrictions to avoid heavy lifting, pushing, or pulling with the right shoulder greater than 5 lbs and no overhead lifting . If you have any questions or concerns, please don't hesitate to call.     Sincerely,        Magda Roblero MD

## 2020-11-19 NOTE — PROGRESS NOTES
11/19/2020   Chief Complaint   Patient presents with    Post-Op Check     right shoulder rotator cuff repair DOS 8/31/2020        History of Present Illness:                             Karuna Jaimes is a 72 y.o. male returns today for follow-up of his right shoulder rotator cuff repair. He has been making good progress with physical therapy improving his strength and range of motion. He feels the shoulder is coming along nicely. Patient returns to the office today 12 weeks post operatively following a right shoulder rotator cuff repair that was performed on 8/31/2020. Patient continues to work on ROM and strengthening exercises with continued improvement, but upon reaching across the chest or behind the back, he experiences a tight, pulling sensation with restriction upon certain movements. Pain is rated at a 6/10 with the left shoulder now becoming more sore due to compensation of the right shoulder. Patient is currently not taking any pain medication at this time since completed course of Ibuprofen 800 mg tabs although he was prescribed the Ibuprofen 600 mg but the pharmacy did not fill the script. Patient is ready to return to work but is asking for light duty restrictions. Examination:  General Exam:  Vitals: Pulse 88   Resp 16   Ht 5' 5\" (1.651 m)   Wt 174 lb (78.9 kg)   SpO2 96%   BMI 28.96 kg/m²    Physical Exam   Operative Extremity:  Right upper extremity:  Well-healed arthroscopic incisions. Good active range of motion of the shoulder with forward elevation to 140 degrees and abduction to 120 degrees with functional rotator cuff strength against resistance. Mild limitation with internal and external rotation compared to the contralateral side. Sensation and motor function is intact throughout. Assessment and Plan  1.   Right rotator cuff repair    He has made good progress with physical therapy and is continuing to make advancements with his range of motion and strengthening activities. I have recommended that he continue to progress with physical therapy and then transition to home exercise program.    We have discussed return to work issues. I have written him a note to be allowed to return to work with restrictions of no lifting pushing pulling or carrying greater than 40 pounds and no overhead activities with the right arm to be in place for 1 month. I would like him to follow-up here in 2 months for check of his progress with the healing process and home strengthening program.    I have given him a refill of the ibuprofen 800 mg tablets to use as needed for pain.         Electronically signed by Nhi Hardy MD on 11/19/2020 at 9:42 AM

## 2020-11-19 NOTE — PROGRESS NOTES
Patient returns to the office today 12 weeks post operatively following a right shoulder rotator cuff repair that was performed on 8/31/2020. Patient continues to work on ROM and strengthening exercises with continued improvement, but upon reaching across the chest or behind the back, he experiences a tight, pulling sensation with restriction upon certain movements. Pain is rated at a 6/10 with the left shoulder now becoming more sore due to compensation of the right shoulder. Patient is currently not taking any pain medication at this time since completed course of Ibuprofen 800 mg tabs although he was prescribed the Ibuprofen 600 mg but the pharmacy did not fill the script. Patient is ready to return to work but is asking for light duty restrictions.

## 2020-11-24 ENCOUNTER — HOSPITAL ENCOUNTER (OUTPATIENT)
Dept: PHYSICAL THERAPY | Age: 65
Discharge: HOME | End: 2020-11-24

## 2020-11-24 NOTE — FLOWSHEET NOTE
Physical Therapy  Cancellation/No-show Note  Patient Name:  Kayli Agrawal  :  1955   Date:  2020  Cancelled visits to date: 1  No-shows to date: 0    For today's appointment patient:  [x]  Cancelled  []  Rescheduled appointment  []  No-show     Reason given by patient:  []  Patient ill  []  Conflicting appointment  []  No transportation    [x]  Conflict with work  []  No reason given  []  Other:     Comments:      Electronically signed by:  Harshil Willoughby PT, DPT

## 2020-12-01 ENCOUNTER — HOSPITAL ENCOUNTER (OUTPATIENT)
Dept: PHYSICAL THERAPY | Age: 65
Setting detail: THERAPIES SERIES
Discharge: HOME | End: 2020-12-01
Payer: OTHER GOVERNMENT

## 2020-12-01 PROCEDURE — 97110 THERAPEUTIC EXERCISES: CPT

## 2020-12-01 PROCEDURE — 97016 VASOPNEUMATIC DEVICE THERAPY: CPT

## 2020-12-01 PROCEDURE — 97112 NEUROMUSCULAR REEDUCATION: CPT

## 2020-12-01 NOTE — FLOWSHEET NOTE
Outpatient Physical Therapy  [place]           [x] Phone: [phone]   Fax: [phone]  [facility]           [] Phone: [phone]   Fax: [phone]        Physical Therapy Daily Treatment Note  Date:  2020    Patient Name:  Lazarus Avena    :  1955  MRN: [id number]  Restrictions/Precautions:   RTC repair precautions - see protocol in chart  Diagnosis:   Diagnosis: Traumatic complete tear of R RTC  Date of Injury/Surgery: 20  Treatment Diagnosis: Treatment Diagnosis: R shoulder pain, RUE weakness, reduced R shoulder ROM    Insurance/Certification information: PT Insurance Information: UMR   Referring Physician:  Referring Practitioner: Víctor Lawrence MD  Next Doctor Visit:  Priscilla Burkett  Plan of care signed (Y/N): yes  Outcome Measure: Brii Banks: 13.63%  Visit# / total visits:    Pain level: 0 /10   Goals:       Short term goals  Time Frame for Short term goals: 4 weeks  Short term goal 1: Pt will acheive full PROM in all directions R shoulder: GOAL MET   Short term goal 2: Pt will report 4 consecutive hours of sleep in bed or better with no interuption due to pain: GOAL MET   Long term goals  Time Frame for Long term goals : 12 weeks  Long term goal 1: Pt will have full AROM in all directions R shoulder to aide in ADLs: Progressing   Long term goal 2: Pt will improve RUE strength gross 4+/5 or higher to aide in work related tasks: Progressing   Long term goal 3: Pt will improve Quick Dash to 33% or lower to show [address] and subjective improvement: GOAL MET     Summary of Evaluation: Assessment: Pt is a 27-year-old male who underwent a RIGHT SHOULDER ARTHROSCOPY ROTATOR CUFF REPAIR (for Supraspinatus tear), SUBACROMIAL DECOMPRESSION and biceps tenotomy on 20. Pt presents with limitations in ROM of R shoulder, RUE weakness, inability to perform work related tasks, reduced independence with ADL tasks and R shoulder pain.  Pt would benefit from skilled therapy interventions to address listed impairments, progress toward goal completion, and improve ADL independence. PT also warranted to reduce risk for re-injury or future decline. Subjective: Pt currently reports of no pain but did have some discomfort first 2 hours of work this morning rating 3/10 but then went away. Overall feels he is doing well with returning to work. Any changes in Ambulatory Summary Sheet? None        Objective:   Prior to today's treatment session, patient was screened for signs and symptoms related to COVID-19 including but not limited to verbally answering questions related to feeling ill, cough, or SOB, along with taking temperature via forehead thermometer. Patient presented with all negative signs and symptoms and had no fever >100 degrees Fahrenheit this date.      PROM R shoulder: full all directions no pain  AROM R shoulder: supine   Flexion 180°  Abduction 180°  IR T-10  ER T-2      Exercises: (No more than 4 columns) 11 weeks as of 11/16  Exercise/Equipment 11/12/20 #9 11/17/20 #10 11/19/2020  #11 12/1/20 #12            WARM UP         Pendulums         Pulley          UBE nustep downstairs x5' 3'/3' 3'/3' nustep downstairs x5'   TABLE       Flex slides       Scaption slides       ER table slide/stretch       Ball on wall x15 med/lat, up/down, clockwise, CC : 2# ball      Wand press ups  X20, 4# X15, 4# 5# DB 10x2 5# DB 10x2   Supine abduction w/ wand       W/ cane ER X15, 4# X15, 4#      Flexion w/  Cane X15, 4# X15, 4# 5# DB 10x2 5# DB 10x2   Sidelying ER 2x12, 4#      AROM              STANDING       Flex/ scaption Against the wall  3# flexion x12  3# x12      TB rows CC 22# 2x12 2x12 Black TB BLKTB 10x2 BLKTB 10x2   PNF D1/D2 flex and ext 2x12 each, RTB in front of mirror  RTB 10x2  RTB 10x ea  D1, D2   Table top pushups x10 x10 10 x 2 10x2   TB exercises  IR 2x10 RTB  ER 2x10 RTb  Flex 2x10 RTB  Abd 2x10 RTB IR 10x2 RTB  ER 10x2 RTB     IR 10x2 RTB  ER 10x2 RTB   Lat pull down  Standing 2x8, 30# 30# 10x2 40# 10x3               PROPRIOCEPTION       Body blade  2x30\" horizontal  2x30\" vertical 2x30\" horizontal  2x30\" vertical 2x30\" horizontal  2x30\" vertical                               MODALITIES       vaso 10' 10' 10'  10'                     Other Therapeutic Activities/Education: HEP and continuation, further POC and d/c plans    Home Exercise Program:    10/3: table slide flex, table slide scaption, table stretch ER, pendulums, scap retractions.  Added wand flexion, wand ER, and wand abduction 10/20/2020  10/22: AROM flex and abd, TB rows    Manual Treatments:      Modalities:  10' Vaso low pressure R shoulder in seated, no adverse skin reactions or complications after completion    Communication with other providers:  POC sent      Time In / Time Out:  9479/8249    Timed Code/Total Treatment Minutes: : 35'/ 39' : 1 TE, 1 neuro, 1 vaso    Next Progress Note due:  10th visit      Plan of Care Interventions:  [x] Therapeutic Exercise  [x] Modalities:  [x] Therapeutic Activity     [x] Ultrasound  [x] Estim  [] Gait Training      [] Cervical Traction [] Lumbar Traction  [x] Neuromuscular Re-education    [x] Cold/hotpack [] Iontophoresis   [x] Instruction in HEP      [x] Vasopneumatic   [] Dry Needling    [x] Manual Therapy               [] Aquatic Therapy              Electronically signed by:  Kendra Friday, PTA 12/1/2020, 3:18 PM

## 2020-12-08 ENCOUNTER — HOSPITAL ENCOUNTER (OUTPATIENT)
Dept: PHYSICAL THERAPY | Age: 65
Setting detail: THERAPIES SERIES
Discharge: HOME | End: 2020-12-08
Payer: OTHER GOVERNMENT

## 2020-12-08 PROCEDURE — 97016 VASOPNEUMATIC DEVICE THERAPY: CPT

## 2020-12-08 PROCEDURE — 97112 NEUROMUSCULAR REEDUCATION: CPT

## 2020-12-08 PROCEDURE — 97110 THERAPEUTIC EXERCISES: CPT

## 2020-12-08 NOTE — FLOWSHEET NOTE
Outpatient Physical Therapy  [place]           [x] Phone: [phone]   Fax: [phone]  [facility]           [] Phone: [phone]   Fax: [phone]        Physical Therapy Daily Treatment Note  Date:  2020    Patient Name:  Haresh Pierce    :  1955  MRN: [id number]  Restrictions/Precautions:   RTC repair precautions - see protocol in chart  Diagnosis:   Diagnosis: Traumatic complete tear of R RTC  Date of Injury/Surgery: 20  Treatment Diagnosis: Treatment Diagnosis: R shoulder pain, RUE weakness, reduced R shoulder ROM    Insurance/Certification information: PT Insurance Information: UMR   Referring Physician:  Referring Practitioner: Mendoza Beltran MD  Next Doctor Visit:  Ev Rhodes  Plan of care signed (Y/N): yes  Outcome Measure: Donte Underwood: 13.63%  Visit# / total visits:    Pain level: 0/10   Goals:       Short term goals  Time Frame for Short term goals: 4 weeks  Short term goal 1: Pt will acheive full PROM in all directions R shoulder: GOAL MET   Short term goal 2: Pt will report 4 consecutive hours of sleep in bed or better with no interuption due to pain: GOAL MET   Long term goals  Time Frame for Long term goals : 12 weeks  Long term goal 1: Pt will have full AROM in all directions R shoulder to aide in ADLs: Progressing   Long term goal 2: Pt will improve RUE strength gross 4+/5 or higher to aide in work related tasks: Progressing   Long term goal 3: Pt will improve Quick Dash to 33% or lower to show [address] and subjective improvement: GOAL MET     Summary of Evaluation: Assessment: Pt is a 43-year-old male who underwent a RIGHT SHOULDER ARTHROSCOPY ROTATOR CUFF REPAIR (for Supraspinatus tear), SUBACROMIAL DECOMPRESSION and biceps tenotomy on 20. Pt presents with limitations in ROM of R shoulder, RUE weakness, inability to perform work related tasks, reduced independence with ADL tasks and R shoulder pain.  Pt would benefit from skilled therapy interventions to address listed impairments, progress toward goal completion, and improve ADL independence. PT also warranted to reduce risk for re-injury or future decline. Subjective:  Pt stated he wasn't having any pain today. Pt stated that he is not overdoing it at work. Pt stated that he only gets pain when wakes up after sleeping on R shoulder. Pt stated that pain is about 4-5/10,but gets better after getting off of it. Any changes in Ambulatory Summary Sheet? None        Objective:   Prior to today's treatment session, patient was screened for signs and symptoms related to COVID-19 including but not limited to verbally answering questions related to feeling ill, cough, or SOB, along with taking temperature via forehead thermometer. Patient presented with all negative signs and symptoms and had no fever >100 degrees Fahrenheit this date.        Able to increase resistance on his shoulder exercises with no issues    Exercises: (No more than 4 columns) 11 weeks as of 11/16  Exercise/Equipment 11/17/20 #10 11/19/2020  #11 12/1/20 #12 12/8/2020 #13            WARM UP         Pendulums         Pulley          UBE 3'/3' 3'/3' nustep downstairs x5' nustep downstairs x5'   TABLE       Flex slides       Scaption slides       ER table slide/stretch       Ball on wall       Wand press ups  X15, 4# 5# DB 10x2 5# DB 10x2 5# DB 10x2   Supine abduction w/ wand       W/ cane ER X15, 4#       Flexion w/  Cane X15, 4# 5# DB 10x2 5# DB 10x2 5# 10x2   Sidelying ER       AROM              STANDING       Flex/ scaption       TB rows 2x12 Black TB BLKTB 10x2 BLKTB 10x2 blkTB 10x2   PNF  RTB 10x2  RTB 10x ea  D1, D2 RTB 10x2    Table top pushups x10 10 x 2 10x2 10x2   TB exercises IR 2x10 RTB  ER 2x10 RTb  Flex 2x10 RTB  Abd 2x10 RTB IR 10x2 RTB  ER 10x2 RTB     IR 10x2 RTB  ER 10x2 RTB BLkTB 10x2 IR  GTB 10x2 ER    Lat pull down Standing 2x8, 30# 30# 10x2 40# 10x3 33# downstairs 10x4               PROPRIOCEPTION Body blade 2x30\" horizontal  2x30\" vertical 2x30\" horizontal  2x30\" vertical 2x30\" horizontal  2x30\" vertical 1' x 2 horizontal   1' x2 vertical                                MODALITIES       vaso 10' 10'  10' 10'                      Other Therapeutic Activities/Education: HEP and continuation, further POC and d/c plans    Home Exercise Program:    10/3: table slide flex, table slide scaption, table stretch ER, pendulums, scap retractions. Added wand flexion, wand ER, and wand abduction 10/20/2020  10/22: AROM flex and abd, TB rows    Manual Treatments:   none    Modalities:  10' Vaso low pressure R shoulder in seated, no adverse skin reactions or complications after completion    Communication with other providers:  POC sent     Assessment: Pt tolerated treatment well. Pt was able to increase activity in the gym today. Pt will continue to benefit from more strengthening.   Time In / Time Out:  1520/1608    Timed Code/Total Treatment Minutes: 38'/48' 2 TE  (23') 1 neuro (15') 1 vaso (10')     Next Progress Note due:  10th visit      Plan of Care Interventions:  [x] Therapeutic Exercise  [x] Modalities:  [x] Therapeutic Activity     [x] Ultrasound  [x] Estim  [] Gait Training      [] Cervical Traction [] Lumbar Traction  [x] Neuromuscular Re-education    [x] Cold/hotpack [] Iontophoresis   [x] Instruction in HEP      [x] Vasopneumatic   [] Dry Needling    [x] Manual Therapy               [] Aquatic Therapy              Electronically signed by:  Steven Fleming, ANGELES 12/8/2020, 2:23 PM        12/8/2020,4:41 PM

## 2020-12-24 ENCOUNTER — HOSPITAL ENCOUNTER (OUTPATIENT)
Dept: PHYSICAL THERAPY | Age: 65
Setting detail: THERAPIES SERIES
Discharge: HOME | End: 2020-12-24
Payer: OTHER GOVERNMENT

## 2020-12-24 PROCEDURE — 97110 THERAPEUTIC EXERCISES: CPT

## 2020-12-24 PROCEDURE — 97016 VASOPNEUMATIC DEVICE THERAPY: CPT

## 2020-12-24 NOTE — DISCHARGE SUMMARY
Outpatient Physical Therapy           [place]           [x] Phone: [phone]   Fax: [phone]  [place]           [] Phone: [phone]   Fax: [phone]       To:   Klever Urbano MD    From: Koko Feliciano, PT, DPT     Patient: Jama Conte                  : 1955  Diagnosis:    Traumatic complete tear of R RTC  Date: 2020   Treatment Diagnosis:   R shoulder pain, RUE weakness, reduced R shoulder ROM        []  Progress Note                [x]  Discharge Note    Evaluation Date:  10/03/20  Total Visits to date:  14 Cancels/No-shows to date:  1    Subjective: Pt states he is doing well this date with no pain. He is no longer having any issues with ADL completion and is doing well with lighter duty at work. He agrees with d/c this date to continue HEP after. Quick Dash: 4.54%       Plan of Care/Treatment to date:  [x] Therapeutic Exercise    [x] Modalities:  [x] Therapeutic Activity     [x] Ultrasound  [x] Electrical Stimulation  [] Gait Training      [] Cervical Traction   [] Lumbar Traction  [x] Neuromuscular Re-education  [x] Cold/hotpack [] Iontophoresis  [x] Instruction in HEP      Other:  [x] Manual Therapy       [x]  Vasopneumatic  [] Aquatic Therapy       []                          Objective/Significant Findings At Last Visit/Comments:    PROM R shoulder: full all directions no pain  AROM R shoulder: WFL all directions     RUE MMT:              Shoulder flex: 5/5              Shoulder abd: 5/5              Shoulder IR: 5/5              Shoulder ER: 5/5              Elbow flex/ ext: 5/5    Assessment:  Pt has shown significant improvement since therapy start regarding improved ROM and strength along with functional mobility independence. Pt is not longer having limitations with any ADL participation and has met all goals at this time. PT educated pt on continuation of HEP for max benefits and reduced risk for re-injury. Goal Status:  [x] Achieved [] Partially Achieved  [] Not Achieved     Changes to goals:         Short term goals  Time Frame for Short term goals: 4 weeks  Short term goal 1: Pt will acheive full PROM in all directions R shoulder: GOAL MET 11/12  Short term goal 2: Pt will report 4 consecutive hours of sleep in bed or better with no interuption due to pain: GOAL MET 11/12  Long term goals  Time Frame for Long term goals : 12 weeks  Long term goal 1: Pt will have full AROM in all directions R shoulder to aide in ADLs: GOAL MET 12/24  Long term goal 2: Pt will improve RUE strength gross 4+/5 or higher to aide in work related tasks: Goal met 12/24  Long term goal 3: Pt will improve Quick Dash to 33% or lower to show [address] and subjective improvement: GOAL ReMET 12/24     Patient Status: [] Continue per initial plan of Care     [x] Patient now discharged     [] Additional visits requested, Please re-certify for additional visits:      Requested frequency/duration: /week for weeks    If we are requesting more visits, we fully anticipate the patient's condition is expected to improve within the treatment timeframe we are requesting. Electronically signed by:  Valerie Lemos PT, DPT 12/24/2020, 8:56 AM    If you have any questions or concerns, please don't hesitate to call.   Thank you for your referral.    Physician Signature:______________________ Date:______ Time: ________  By signing above, therapists plan is approved by physician

## 2020-12-24 NOTE — FLOWSHEET NOTE
Outpatient Physical Therapy  [place]           [x] Phone: [phone]   Fax: [phone]  [facility]           [] Phone: [phone]   Fax: [phone]        Physical Therapy Daily Treatment Note  Date:  2020    Patient Name:  Chaparrita Carlson    :  1955  MRN: [id number]  Restrictions/Precautions:   RTC repair precautions - see protocol in chart  Diagnosis:   Diagnosis: Traumatic complete tear of R RTC  Date of Injury/Surgery: 20  Treatment Diagnosis: Treatment Diagnosis: R shoulder pain, RUE weakness, reduced R shoulder ROM    Insurance/Certification information: PT Insurance Information: UMR   Referring Physician:  Referring Practitioner: Jose Davenport MD  Next Doctor Visit:  Shayne Pacheco  Plan of care signed (Y/N): yes  Outcome Measure: Violette Franks: 4.54%  Visit# / total visits:    Pain level: 0/10   Goals:       Short term goals  Time Frame for Short term goals: 4 weeks  Short term goal 1: Pt will acheive full PROM in all directions R shoulder: GOAL MET   Short term goal 2: Pt will report 4 consecutive hours of sleep in bed or better with no interuption due to pain: GOAL MET   Long term goals  Time Frame for Long term goals : 12 weeks  Long term goal 1: Pt will have full AROM in all directions R shoulder to aide in ADLs: GOAL MET   Long term goal 2: Pt will improve RUE strength gross 4+/5 or higher to aide in work related tasks: Goal met   Long term goal 3: Pt will improve Quick Dash to 33% or lower to show [address] and subjective improvement: GOAL ReMET  Summary of Evaluation: Assessment: Pt is a 58-year-old male who underwent a RIGHT SHOULDER ARTHROSCOPY ROTATOR CUFF REPAIR (for Supraspinatus tear), SUBACROMIAL DECOMPRESSION and biceps tenotomy on 8/31/20. Pt presents with limitations in ROM of R shoulder, RUE weakness, inability to perform work related tasks, reduced independence with ADL tasks and R shoulder pain. Pt would benefit from skilled therapy interventions to address listed impairments, progress toward goal completion, and improve ADL independence. PT also warranted to reduce risk for re-injury or future decline. Subjective: Pt states he is doing well this date with no pain. He is no longer having any issues with ADL completion and is doing well with lighter duty at work. He agrees with d/c this date to continue HEP after. Quick Dash: 4.54%    Any changes in Ambulatory Summary Sheet? None      Objective:   Prior to today's treatment session, patient was screened for signs and symptoms related to COVID-19 including but not limited to verbally answering questions related to feeling ill, cough, or SOB, along with taking temperature via forehead thermometer. Patient presented with all negative signs and symptoms and had no fever >100 degrees Fahrenheit this date.      PROM R shoulder: full all directions no pain  AROM R shoulder: WFL all directions    RUE MMT:              Shoulder flex: 5/5              Shoulder abd: 5/5              Shoulder IR: 5/5              Shoulder ER: 5/5              Elbow flex/ ext: 5/5      Exercises: (No more than 4 columns)  16 weeks on 12/21  Exercise/Equipment 12/1/20 #12 12/8/2020 #13 12/24/20 #14           WARM UP        Pendulums        Pulley         UBE nustep downstairs x5' nustep downstairs x5' 3'/3'   TABLE      Flex slides      Scaption slides      ER table slide/stretch      Ball on wall      Wand press ups  5# DB 10x2 5# DB 10x2    Supine abduction w/ wand      W/ cane ER Flexion w/  Cane 5# DB 10x2 5# 10x2    Sidelying ER      AROM            STANDING      Flex/ scaption      TB rows BLKTB 10x2 blkTB 10x2 2x10, 30#   PNF RTB 10x ea  D1, D2 RTB 10x2     Table top pushups 10x2 10x2 2x10   TB exercises IR 10x2 RTB  ER 10x2 RTB BLkTB 10x2 IR  GTB 10x2 ER  Black TB x10 ea IR and ER   Lat pull down 40# 10x3 33# downstairs 10x4 2x10, 50#              PROPRIOCEPTION      Body blade 2x30\" horizontal  2x30\" vertical 1' x 2 horizontal   1' x2 vertical  X45\" horizontal  X45\" vertical                            MODALITIES      vaso 10' 10'  10'                   Other Therapeutic Activities/Education: HEP and continuation after d/c to reduce risk for re-injury or future decline. Home Exercise Program:    10/3: table slide flex, table slide scaption, table stretch ER, pendulums, scap retractions. Added wand flexion, wand ER, and wand abduction 10/20/2020  10/22: AROM flex and abd, TB rows    Manual Treatments:   none    Modalities:  10' Vaso low pressure R shoulder in seated, no adverse skin reactions or complications after completion    Communication with other providers:  POC sent     Assessment: Pt tolerated today's treatment without any adverse reactions or complications this date. Pt has shown significant improvement since therapy start regarding improved ROM and strength along with functional mobility independence. Pt is not longer having limitations with any ADL participation and has met all goals at this time. PT educated pt on continuation of HEP for max benefits and reduced risk for re-injury.   End pain: 0/10    Time In / Time Out:  1302 - 1339    Timed Code/Total Treatment Minutes: 27'/37': 10' vaso x1, 27' TE x2    Next Progress Note due:  10th visit      Plan of Care Interventions:  [x] Therapeutic Exercise  [x] Modalities:  [x] Therapeutic Activity     [x] Ultrasound  [x] Estim  [] Gait Training      [] Cervical Traction [] Lumbar Traction [x] Neuromuscular Re-education    [x] Cold/hotpack [] Iontophoresis   [x] Instruction in HEP      [x] Vasopneumatic   [] Dry Needling    [x] Manual Therapy               [] Aquatic Therapy              Electronically signed by:  Aldair Chen PT, DPT 12/24/2020, 8:53 AM        12/24/2020,8:53 AM

## 2021-01-21 ENCOUNTER — OFFICE VISIT (OUTPATIENT)
Dept: ORTHOPEDIC SURGERY | Age: 66
End: 2021-01-21
Payer: COMMERCIAL

## 2021-01-21 VITALS — HEIGHT: 65 IN | RESPIRATION RATE: 15 BRPM | WEIGHT: 174 LBS | BODY MASS INDEX: 28.99 KG/M2

## 2021-01-21 DIAGNOSIS — Z98.890 S/P RIGHT ROTATOR CUFF REPAIR: Primary | ICD-10-CM

## 2021-01-21 PROCEDURE — 99212 OFFICE O/P EST SF 10 MIN: CPT | Performed by: ORTHOPAEDIC SURGERY

## 2021-01-21 ASSESSMENT — ENCOUNTER SYMPTOMS
CHEST TIGHTNESS: 0
COLOR CHANGE: 0
SHORTNESS OF BREATH: 0

## 2021-01-21 NOTE — PROGRESS NOTES
1/21/2021   Chief Complaint   Patient presents with    Follow-up     s/p right shoulder arthroscopy DOS 8/31/2020        History of Present Illness:                             Nickie Garner is a 72 y.o. male returns today for a follow-up of his right shoulder. He has done very well building up his strength and range of motion. He has no pain today. He feels that his shoulder has good strength and range of motion he is very pleased with his progress. No concerns or questions today. Patient returns today for s/p right shoulder arthroscopy DOS 8/31/2020. Pt states pain today is a 0/10. Pt states he will wake up at night from the pain. Pt states wants he gets in the shower the pain will ease up. Pt states that overall he is doing great. Medical History  Patient's medications, allergies, past medical, surgical, social and family histories were reviewed and updated as appropriate. Review of Systems:   Review of Systems   Constitutional: Negative for activity change and fever. Respiratory: Negative for chest tightness and shortness of breath. Cardiovascular: Negative for chest pain. Skin: Negative for color change. Neurological: Negative for dizziness. Psychiatric/Behavioral: The patient is not nervous/anxious. Examination:  General Exam:  Vitals: Resp 15   Ht 5' 5\" (1.651 m)   Wt 174 lb (78.9 kg)   BMI 28.96 kg/m²    Physical Exam   Right upper extremity:  Well-healed arthroscopic scars. Painless full active range of motion at the shoulder with full forward elevation, abduction, internal rotation, and external rotation. Strength is 5 out of 5 with rotator cuff tearing forward elevation, abduction, and external rotation.   Sensation and motor function is intact throughout the upper extremity with only mild decrease sensation at the anterior aspect the shoulder adjacent to the the anterior arthroscopic incision Office Procedures:  No orders of the defined types were placed in this encounter. Assessment and Plan  1. Right shoulder rotator cuff repair    He has done extremely well with his rehabilitation process. He has full strength and range of motion today. I have recommended he continue with his activities as tolerated. He will follow-up as needed.         Electronically signed by Wilmer Martínez MD on 1/21/2021 at 3:34 PM

## 2021-01-21 NOTE — PROGRESS NOTES
Patient returns today for s/p right shoulder arthroscopy DOS 8/31/2020. Pt states pain today is a 0/10. Pt states he will wake up at night from the pain. Pt states wants he gets in the shower the pain will ease up. Pt states that overall he is doing great.

## 2024-09-26 ENCOUNTER — HOSPITAL ENCOUNTER (OUTPATIENT)
Dept: NUCLEAR MEDICINE | Age: 69
Discharge: HOME | End: 2024-09-26
Attending: FAMILY MEDICINE
Payer: COMMERCIAL

## 2024-09-26 DIAGNOSIS — M84.859: ICD-10-CM

## 2024-09-26 PROCEDURE — A9503 TC99M MEDRONATE: HCPCS | Performed by: FAMILY MEDICINE

## 2024-09-26 PROCEDURE — 3430000000 HC RX DIAGNOSTIC RADIOPHARMACEUTICAL: Performed by: FAMILY MEDICINE

## 2024-09-26 PROCEDURE — 78300 BONE IMAGING LIMITED AREA: CPT

## 2024-09-26 RX ORDER — TC 99M MEDRONATE 20 MG/10ML
27.4 INJECTION, POWDER, LYOPHILIZED, FOR SOLUTION INTRAVENOUS
Status: COMPLETED | OUTPATIENT
Start: 2024-09-26 | End: 2024-09-26

## 2024-09-26 RX ADMIN — TC 99M MEDRONATE 27.4 MILLICURIE: 20 INJECTION, POWDER, LYOPHILIZED, FOR SOLUTION INTRAVENOUS at 09:32

## 2024-10-11 NOTE — PROGRESS NOTES
Physical Therapy  Initial Assessment  Date: 10/3/2020  Patient Name: Cliff Vieyra  MRN: [id number]  : 1955     Treatment Diagnosis: R shoulder pain, RUE weakness, reduced R shoulder ROM    Restrictions: RTC repair precautions - follow protocol in chart. Only PROM for first 6 weeks. Subjective   General  Chart Reviewed: Yes  Patient assessed for rehabilitation services?: Yes  Additional Pertinent Hx: RA, L total shoulder  Referring Practitioner: Octaviano Campo MD  Referral Date : 09/15/20  Diagnosis: Traumatic complete tear of R RTC  Follows Commands: Within Functional Limits  PT Visit Information  PT Insurance Information: [employer]  Subjective  Subjective: Pt had a RIGHT SHOULDER ARTHROSCOPY ROTATOR CUFF REPAIR, SUBACROMIAL DECOMPRESSION and biceps tenotomy on 20. Pt injury originally occurred from wear and tear vs traumatic injury. Pt is R handed but can do all his ADLs with extra time and compensation. Pt is currently off work until  for recovery. Current 2/10 dull/ ache pain. Pt denies any radicular symptoms in RUE. Pain Screening  Patient Currently in Pain: Yes  Pain Assessment  Pain Assessment: 0-10  Pain Level: 2  Pain Type: Surgical pain  Pain Location: Shoulder  Pain Orientation: Right  Pain Descriptors: Dull;Aching; Throbbing;Tightness  Pain Frequency: Continuous  Pain Onset: Gradual  Clinical Progression: Gradually improving  Functional Pain Assessment: Prevents or interferes some active activities and ADLs  Vital Signs  Patient Currently in Pain: Yes    Vision/Hearing  Vision  Vision: Within Functional Limits  Hearing  Hearing: Within functional limits    Orientation  Orientation  Overall Orientation Status: Within Normal Limits    Social/Functional History  Social/Functional History  Lives With: Family  Type of Home: House  ADL Assistance: Independent  Homemaking Assistance: Independent  Ambulation Assistance: Independent  Transfer Assistance: Independent  Active : above  Barriers to Learning: none. Learning style: demonstration  REQUIRES PT FOLLOW UP: Yes  Treatment Initiated : yes on 10/3    Patient agrees with established plan of care and assisted in the development of their short term and long term goals. Patient had no adverse reaction with initial treatment and there are no barriers to learning. Demonstrates no mental or cognitive disorder.          Plan   Plan  Times per week: 2  Times per day: Daily  Plan weeks: 12  Specific instructions for Next Treatment: PROM only first 6 weeks - progress per protocol in chart  Current Treatment Recommendations: Strengthening, IADL Training, Neuromuscular Re-education, Home Exercise Program, ROM, Manual Therapy - Soft Tissue Mobilization, Safety Education & Training, Patient/Caregiver Education & Training, Functional Mobility Training, Modalities, Pain Management, ADL/Self-care Training    OutComes Score   Quick dash: 43.18%    Goals  Short term goals  Time Frame for Short term goals: 4 weeks  Short term goal 1: Pt will acheive full PROM in all directions R shoulder  Short term goal 2: Pt will report 4 consecutive hours of sleep in bed or better with no interuption due to pain  Long term goals  Time Frame for Long term goals : 12 weeks  Long term goal 1: Pt will have full AROM in all directions R shoulder to aide in ADLs  Long term goal 2: Pt will improve RUE strength gross 4+/5 or higher to aide in work related tasks  Long term goal 3: Pt will improve Quick Dash to 33% or lower to show [address] and subjective improvement  Patient Goals   Patient goals : Use right arm normally again       Therapy Time: 9795-4669  Koko Conception, PT, DPT Discharged

## (undated) DEVICE — TOWEL,OR,DSP,ST,BLUE,STD,6/PK,12PK/CS: Brand: MEDLINE

## (undated) DEVICE — CANNULA ARTHSCP L7CM DIA6MM CONIC TIP THRD NONSHIELDED DISP

## (undated) DEVICE — 3M™ STERI-DRAPE™ U-DRAPE 1015: Brand: STERI-DRAPE™

## (undated) DEVICE — TUBING PMP L16FT MAIN DISP FOR AR-6400 AR-6475

## (undated) DEVICE — COUNTER NDL 30 COUNT FOAM STRP SGL MAG

## (undated) DEVICE — CANNULA ARTHSCP L7CM DIA7MM TRNSLUC THRD FLX W/ NO SQUIRT

## (undated) DEVICE — IMMOBILIZER SHLDR SWTH UNIV DEV BLK P.A.D. III

## (undated) DEVICE — INTENDED FOR TISSUE SEPARATION, AND OTHER PROCEDURES THAT REQUIRE A SHARP SURGICAL BLADE TO PUNCTURE OR CUT.: Brand: BARD-PARKER ® STAINLESS STEEL BLADES

## (undated) DEVICE — APPLICATOR MEDICATED 26 CC SOLUTION HI LT ORNG CHLORAPREP

## (undated) DEVICE — 3M™ STERI-DRAPE™ INCISE DRAPE 1050 (60CM X 45CM): Brand: STERI-DRAPE™

## (undated) DEVICE — GLOVE SURG SZ 65 CRM LTX FREE POLYISOPRENE POLYMER BEAD ANTI

## (undated) DEVICE — PROTECTOR EYE PT SELF ADH NS OPT GRD LF

## (undated) DEVICE — DRESSING,GAUZE,XEROFORM,CURAD,1"X8",ST: Brand: CURAD

## (undated) DEVICE — TUBING, SUCTION, 9/32" X 10', STRAIGHT: Brand: MEDLINE

## (undated) DEVICE — PAD,ABDOMINAL,5"X9",ST,LF,25/BX: Brand: MEDLINE INDUSTRIES, INC.

## (undated) DEVICE — MAT FLOOR ULTRA ABS 28X48IN

## (undated) DEVICE — 3M™ STERI-DRAPE™ U-DRAPE 1067 1067 5/BX 4BX/CS/CTN&#X20;: Brand: STERI-DRAPE™

## (undated) DEVICE — NEEDLE SUT PASS FOR ROT CUF LABRAL REP MULTFI SCORPION

## (undated) DEVICE — 4-PORT MANIFOLD: Brand: NEPTUNE 2

## (undated) DEVICE — Z INACTIVE USE 2660664 SOLUTION IRRIG 3000ML 0.9% SOD CHL USP UROMATIC PLAS CONT

## (undated) DEVICE — WRAP POS SUPP DISP FOR L ARM

## (undated) DEVICE — BLADE SAW RESECTOR CUT 4MM

## (undated) DEVICE — SPONGE GZ W4XL8IN COT WVN 12 PLY

## (undated) DEVICE — SUTURE ETHLN SZ 3-0 L30IN NONABSORBABLE BLK FS-1 L24MM 3/8 669H

## (undated) DEVICE — WEREWOLF FLOW 90 COBLATION WAND: Brand: COBLATION

## (undated) DEVICE — GAUZE,SPONGE,4"X4",16PLY,XRAY,STRL,LF: Brand: MEDLINE